# Patient Record
Sex: FEMALE | URBAN - METROPOLITAN AREA
[De-identification: names, ages, dates, MRNs, and addresses within clinical notes are randomized per-mention and may not be internally consistent; named-entity substitution may affect disease eponyms.]

---

## 2023-12-05 PROBLEM — F31.64 BIPOLAR DISORDER, CURRENT EPISODE MIXED, SEVERE, WITH PSYCHOTIC FEATURES (HCC): Status: ACTIVE | Noted: 2023-12-05

## 2023-12-20 PROBLEM — F33.2 MAJOR DEPRESSIVE DISORDER, RECURRENT SEVERE WITHOUT PSYCHOTIC FEATURES (HCC): Status: ACTIVE | Noted: 2023-12-20

## 2023-12-20 PROBLEM — F41.1 GENERALIZED ANXIETY DISORDER: Status: ACTIVE | Noted: 2023-12-20

## 2024-01-09 PROBLEM — F32.9 MAJOR DEPRESSIVE DISORDER WITH CURRENT ACTIVE EPISODE: Status: ACTIVE | Noted: 2024-01-09

## 2024-04-02 ENCOUNTER — OFFICE VISIT (OUTPATIENT)
Dept: FAMILY MEDICINE CLINIC | Facility: CLINIC | Age: 39
End: 2024-04-02
Payer: COMMERCIAL

## 2024-04-02 VITALS
RESPIRATION RATE: 16 BRPM | DIASTOLIC BLOOD PRESSURE: 82 MMHG | TEMPERATURE: 98 F | WEIGHT: 188 LBS | HEART RATE: 80 BPM | OXYGEN SATURATION: 98 % | SYSTOLIC BLOOD PRESSURE: 124 MMHG | HEIGHT: 63 IN | BODY MASS INDEX: 33.31 KG/M2

## 2024-04-02 DIAGNOSIS — Z01.419 WELL WOMAN EXAM WITH ROUTINE GYNECOLOGICAL EXAM: ICD-10-CM

## 2024-04-02 DIAGNOSIS — F33.2 MAJOR DEPRESSIVE DISORDER, RECURRENT SEVERE WITHOUT PSYCHOTIC FEATURES (HCC): ICD-10-CM

## 2024-04-02 DIAGNOSIS — E03.8 OTHER SPECIFIED HYPOTHYROIDISM: Primary | ICD-10-CM

## 2024-04-02 DIAGNOSIS — F31.64 BIPOLAR DISORDER, CURRENT EPISODE MIXED, SEVERE, WITH PSYCHOTIC FEATURES (HCC): ICD-10-CM

## 2024-04-02 DIAGNOSIS — F41.1 GENERALIZED ANXIETY DISORDER: ICD-10-CM

## 2024-04-02 PROCEDURE — 3008F BODY MASS INDEX DOCD: CPT | Performed by: FAMILY MEDICINE

## 2024-04-02 PROCEDURE — 3079F DIAST BP 80-89 MM HG: CPT | Performed by: FAMILY MEDICINE

## 2024-04-02 PROCEDURE — 99203 OFFICE O/P NEW LOW 30 MIN: CPT | Performed by: FAMILY MEDICINE

## 2024-04-02 PROCEDURE — 3074F SYST BP LT 130 MM HG: CPT | Performed by: FAMILY MEDICINE

## 2024-04-02 NOTE — PROGRESS NOTES
Chief Complaint   Patient presents with    Thyroid Problem     Check thyroid levels     Patient states she had pap last year.. Patient doesn't remember Dr name and will get back to us. Patient will see gyn for pap      Note to patient: The 21st Century Cures Act makes medical notes like these available to patients in the interest of transparency. However, be advised this is a medical document. It is intended as peer to peer communication. It is written in medical language and may contain abbreviations or verbiage that are unfamiliar. It may appear blunt or direct. Medical documents are intended to carry relevant information, facts as evident, and the clinical opinion of the practitioner.     HPI:    Patient here for hypothyroid.   Diagnosed in December   Periods are better, mood better.   Insomnia- ambien prn   Last A1c value was 5.3% done 12/6/2023.       Reports recurrent loss in family that made her have acute breakdown in December. She was admitted at . She is under their care. Currently on Olanzapine.   She was having severe depressive episodes.      She is on 25mcg of levothyrixine since December.     Wt Readings from Last 6 Encounters:   04/02/24 188 lb (85.3 kg)         Review of Systems   Constitutional: Negative for fever, chills and fatigue. No distress.  Respiratory: Negative for cough, chest tightness, shortness of breath and wheezing.    Cardiovascular: Negative for chest pain, palpitations and leg swelling.   Gastrointestinal: Negative for nausea, vomiting     Patient Active Problem List   Diagnosis    Bipolar disorder, current episode mixed, severe, with psychotic features (HCC)    Major depressive disorder, recurrent severe without psychotic features (HCC)    Generalized anxiety disorder    Major depressive disorder with current active episode       Past Medical History:   Diagnosis Date    Dysmenorrhea     Hypothyroidism      History reviewed. No pertinent surgical history.  Family History    Problem Relation Age of Onset    Hypertension Father      Social History     Socioeconomic History    Marital status:    Tobacco Use    Smoking status: Never     Passive exposure: Never    Smokeless tobacco: Never   Vaping Use    Vaping Use: Never used   Substance and Sexual Activity    Alcohol use: Not Currently    Drug use: Never       Current Outpatient Medications   Medication Sig Dispense Refill    clonazePAM 1 MG Oral Tab Take 1 tablet (1 mg total) by mouth 3 (three) times daily as needed for Anxiety. 90 tablet 1    metFORMIN 500 MG Oral Tab Take 1 tablet (500 mg total) by mouth daily with breakfast. 30 tablet 1    OLANZapine 10 MG Oral Tab Take 1 tablet (10 mg total) by mouth at bedtime. 30 tablet 1    zolpidem 10 MG Oral Tab Take 1 tablet (10 mg total) by mouth nightly as needed for Sleep. 30 tablet 1    levothyroxine 25 MCG Oral Tab Take 1 tablet (25 mcg total) by mouth before breakfast. 30 tablet 0    Norethindrone Acet-Ethinyl Est (URI 1/20) 1-20 MG-MCG Oral Tab Take 1 tablet by mouth daily.         Allergies  Allergies   Allergen Reactions    Ceclor [Cefaclor] RASH       Health Maintenance  Immunizations:  Immunization History   Administered Date(s) Administered    Covid-19 Vaccine Pfizer 30 mcg/0.3 ml 06/15/2021, 07/06/2021         Physical Exam  /82   Pulse 80   Temp 98 °F (36.7 °C) (Temporal)   Resp 16   Ht 5' 3\" (1.6 m)   Wt 188 lb (85.3 kg)   LMP 11/28/2023 (Approximate)   SpO2 98%   BMI 33.30 kg/m²   Constitutional: Oriented to person, place, and time. No distress.   HEENT:  Normocephalic and atraumatic.  Neck:  No mass and no thyromegaly present.   Cardiovascular: Normal rate, regular rhythm   Skin: Skin is warm and dry.  Psychiatric: Normal mood and affect.     A/P:    Encounter Diagnoses   Name Primary?    Bipolar disorder, current episode mixed, severe, with psychotic features (HCC)     Major depressive disorder, recurrent severe without psychotic features (HCC)      Generalized anxiety disorder     Well woman exam with routine gynecological exam     Other specified hypothyroidism Yes       1. Bipolar disorder, current episode mixed, severe, with psychotic features (HCC)    2. Major depressive disorder, recurrent severe without psychotic features (HCC)   at goal, no suicidal or homicidal thoughts. Continue present management. Patient will call if any symptoms worsen. Patient understands risks that anti-depressants as well as anti-anxiety agents have been shown to paradoxically worsen anxiety and depression and trigger suicidal thoughts. If any of these thoughts are present patient will stop the medication(s) immediately and call the office. Patient understands and agrees to the above plan. Medication: Continue with the same medication(s)-- per psych.     3. Generalized anxiety disorder    4. Well woman exam with routine gynecological exam  Referred to GYN.   - CBC With Differential With Platelet; Future  - Comp Metabolic Panel (14); Future  - Lipid Panel; Future  - TSH W Reflex To Free T4; Future  - OBG Referral - In Network    5. Other specified hypothyroidism  Continue on levothyroxine 25mcg daily.   Will recheck labs.    Orders Placed This Encounter   Procedures    CBC With Differential With Platelet    Comp Metabolic Panel (14)    Lipid Panel    TSH W Reflex To Free T4       Meds & Refills for this Visit:  Requested Prescriptions      No prescriptions requested or ordered in this encounter       Imaging & Consults:  OBG - INTERNAL      No follow-ups on file.    There are no Patient Instructions on file for this visit.    All questions were answered and the patient understands the plan.     Ingris Farias,         This note was prepared using Dragon Medical voice recognition dictation software. As a result errors may occur. When identified these errors have been corrected. While every attempt is made to correct errors during dictation discrepancies may still  exist.      Note to patient: The 21st Century Cures Act makes medical notes like these available to patients in the interest of transparency. However, be advised this is a medical document. It is intended as peer to peer communication. It is written in medical language and may contain abbreviations or verbiage that are unfamiliar. It may appear blunt or direct. Medical documents are intended to carry relevant information, facts as evident, and the clinical opinion of the practitioner.

## 2024-04-23 DIAGNOSIS — F32.9 MAJOR DEPRESSIVE DISORDER WITH CURRENT ACTIVE EPISODE, UNSPECIFIED DEPRESSION EPISODE SEVERITY, UNSPECIFIED WHETHER RECURRENT: ICD-10-CM

## 2024-04-23 RX ORDER — LEVOTHYROXINE SODIUM 0.03 MG/1
25 TABLET ORAL
Qty: 30 TABLET | Refills: 0 | Status: SHIPPED | OUTPATIENT
Start: 2024-04-23 | End: 2024-05-23

## 2024-04-23 NOTE — TELEPHONE ENCOUNTER
Pt would like a new prescription for the Levothyroxine sent to local [facility] pharmacy. Pt is aware that she needs to have labs done.

## 2024-04-23 NOTE — TELEPHONE ENCOUNTER
Medication Quantity Refills Start End   levothyroxine 25 MCG Oral Tab () 30 tablet 0 3/5/2024 2024   Sig:   Take 1 tablet (25 mcg total) by mouth before breakfast.     Route:   Oral     Order #:   [id number]       Last OV 24  5. Other specified hypothyroidism  Continue on levothyroxine 25mcg daily.   Will recheck labs.      RX sent for 30 to [facility]

## 2024-05-06 ENCOUNTER — TELEPHONE (OUTPATIENT)
Dept: FAMILY MEDICINE CLINIC | Facility: CLINIC | Age: 39
End: 2024-05-06

## 2024-05-06 NOTE — TELEPHONE ENCOUNTER
Pts spouse calling in stating that he needs medical advise. Patient has stopped taking ZyPREXA and has questions.     Please call back and advise.   [phone]

## 2024-05-31 ENCOUNTER — LAB ENCOUNTER (OUTPATIENT)
Dept: LAB | Age: 39
End: 2024-05-31
Attending: FAMILY MEDICINE
Payer: COMMERCIAL

## 2024-05-31 DIAGNOSIS — Z01.419 WELL WOMAN EXAM WITH ROUTINE GYNECOLOGICAL EXAM: ICD-10-CM

## 2024-05-31 LAB
ALBUMIN SERPL-MCNC: 3.7 G/DL (ref 3.4–5)
ALBUMIN/GLOB SERPL: 1.1 {RATIO} (ref 1–2)
ALP LIVER SERPL-CCNC: 66 U/L
ALT SERPL-CCNC: 61 U/L
ANION GAP SERPL CALC-SCNC: 7 MMOL/L (ref 0–18)
AST SERPL-CCNC: 48 U/L (ref 15–37)
BASOPHILS # BLD AUTO: 0.04 X10(3) UL (ref 0–0.2)
BASOPHILS NFR BLD AUTO: 0.7 %
BILIRUB SERPL-MCNC: 0.6 MG/DL (ref 0.1–2)
BUN BLD-MCNC: 7 MG/DL (ref 9–23)
CALCIUM BLD-MCNC: 9.3 MG/DL (ref 8.5–10.1)
CHLORIDE SERPL-SCNC: 106 MMOL/L (ref 98–112)
CHOLEST SERPL-MCNC: 248 MG/DL (ref ?–200)
CO2 SERPL-SCNC: 23 MMOL/L (ref 21–32)
CREAT BLD-MCNC: 0.6 MG/DL
EGFRCR SERPLBLD CKD-EPI 2021: 117 ML/MIN/1.73M2 (ref 60–?)
EOSINOPHIL # BLD AUTO: 0.15 X10(3) UL (ref 0–0.7)
EOSINOPHIL NFR BLD AUTO: 2.5 %
ERYTHROCYTE [DISTWIDTH] IN BLOOD BY AUTOMATED COUNT: 12.9 %
FASTING PATIENT LIPID ANSWER: YES
FASTING STATUS PATIENT QL REPORTED: YES
GLOBULIN PLAS-MCNC: 3.3 G/DL (ref 2.8–4.4)
GLUCOSE BLD-MCNC: 101 MG/DL (ref 70–99)
HCT VFR BLD AUTO: 40.2 %
HDLC SERPL-MCNC: 55 MG/DL (ref 40–59)
HGB BLD-MCNC: 13.1 G/DL
IMM GRANULOCYTES # BLD AUTO: 0.02 X10(3) UL (ref 0–1)
IMM GRANULOCYTES NFR BLD: 0.3 %
LDLC SERPL CALC-MCNC: 157 MG/DL (ref ?–100)
LYMPHOCYTES # BLD AUTO: 1.92 X10(3) UL (ref 1–4)
LYMPHOCYTES NFR BLD AUTO: 31.9 %
MCH RBC QN AUTO: 30.2 PG (ref 26–34)
MCHC RBC AUTO-ENTMCNC: 32.6 G/DL (ref 31–37)
MCV RBC AUTO: 92.6 FL
MONOCYTES # BLD AUTO: 0.62 X10(3) UL (ref 0.1–1)
MONOCYTES NFR BLD AUTO: 10.3 %
NEUTROPHILS # BLD AUTO: 3.27 X10 (3) UL (ref 1.5–7.7)
NEUTROPHILS # BLD AUTO: 3.27 X10(3) UL (ref 1.5–7.7)
NEUTROPHILS NFR BLD AUTO: 54.3 %
NONHDLC SERPL-MCNC: 193 MG/DL (ref ?–130)
OSMOLALITY SERPL CALC.SUM OF ELEC: 280 MOSM/KG (ref 275–295)
PLATELET # BLD AUTO: 295 10(3)UL (ref 150–450)
POTASSIUM SERPL-SCNC: 4 MMOL/L (ref 3.5–5.1)
PROT SERPL-MCNC: 7 G/DL (ref 6.4–8.2)
RBC # BLD AUTO: 4.34 X10(6)UL
SODIUM SERPL-SCNC: 136 MMOL/L (ref 136–145)
TRIGL SERPL-MCNC: 199 MG/DL (ref 30–149)
TSI SER-ACNC: 1.61 MIU/ML (ref 0.36–3.74)
VLDLC SERPL CALC-MCNC: 39 MG/DL (ref 0–30)
WBC # BLD AUTO: 6 X10(3) UL (ref 4–11)

## 2024-05-31 PROCEDURE — 80061 LIPID PANEL: CPT | Performed by: FAMILY MEDICINE

## 2024-05-31 PROCEDURE — 80050 GENERAL HEALTH PANEL: CPT | Performed by: FAMILY MEDICINE

## 2024-07-12 ENCOUNTER — TELEPHONE (OUTPATIENT)
Dept: FAMILY MEDICINE CLINIC | Facility: CLINIC | Age: 39
End: 2024-07-12

## 2024-07-12 DIAGNOSIS — F32.9 MAJOR DEPRESSIVE DISORDER WITH CURRENT ACTIVE EPISODE, UNSPECIFIED DEPRESSION EPISODE SEVERITY, UNSPECIFIED WHETHER RECURRENT: ICD-10-CM

## 2024-07-12 RX ORDER — LEVOTHYROXINE SODIUM 0.03 MG/1
25 TABLET ORAL
Qty: 90 TABLET | Refills: 0 | Status: SHIPPED | OUTPATIENT
Start: 2024-07-12

## 2024-07-12 NOTE — TELEPHONE ENCOUNTER
Medication Quantity Refills Start End   levothyroxine 25 MCG Oral Tab () 30 tablet 0 2024   Sig:   Take 1 tablet (25 mcg total) by mouth before breakfast.     Route:   Oral     Order #:   [id number]       Last OV 24  5. Other specified hypothyroidism  Continue on levothyroxine 25mcg daily.   Will recheck labs.    Ingris Farias DO  2024  7:17 PM CDT       High TG, LFT's and cholesterol, f/u for annual, will review labs.     RX sent    Patient is due for physical. Please schedule

## 2024-07-12 NOTE — TELEPHONE ENCOUNTER
Patient's spouse calling for levo refill, says she sees a RN via telehealth monthly and was getting it from her and now wants to get it filled here, had labs in May.

## 2025-01-27 ENCOUNTER — APPOINTMENT (OUTPATIENT)
Dept: CT IMAGING | Age: 40
End: 2025-01-27
Attending: EMERGENCY MEDICINE
Payer: COMMERCIAL

## 2025-01-27 ENCOUNTER — TELEPHONE (OUTPATIENT)
Dept: FAMILY MEDICINE CLINIC | Facility: CLINIC | Age: 40
End: 2025-01-27

## 2025-01-27 ENCOUNTER — HOSPITAL ENCOUNTER (EMERGENCY)
Age: 40
Discharge: HOME | End: 2025-01-27
Attending: EMERGENCY MEDICINE
Payer: COMMERCIAL

## 2025-01-27 VITALS
TEMPERATURE: 98 F | BODY MASS INDEX: 23.92 KG/M2 | HEART RATE: 65 BPM | OXYGEN SATURATION: 100 % | HEIGHT: 63 IN | RESPIRATION RATE: 16 BRPM | SYSTOLIC BLOOD PRESSURE: 152 MMHG | DIASTOLIC BLOOD PRESSURE: 98 MMHG | WEIGHT: 135 LBS

## 2025-01-27 DIAGNOSIS — R11.2 REFRACTORY NAUSEA AND VOMITING: Primary | ICD-10-CM

## 2025-01-27 DIAGNOSIS — N94.6 MENSTRUAL CRAMPS: ICD-10-CM

## 2025-01-27 LAB
ALBUMIN SERPL-MCNC: 5.5 G/DL (ref 3.2–4.8)
ALBUMIN/GLOB SERPL: 1.7 {RATIO} (ref 1–2)
ALP LIVER SERPL-CCNC: 87 U/L
ALT SERPL-CCNC: 86 U/L
ANION GAP SERPL CALC-SCNC: 17 MMOL/L (ref 0–18)
AST SERPL-CCNC: 65 U/L (ref ?–34)
B-HCG UR QL: NEGATIVE
BASE EXCESS BLD CALC-SCNC: -7 MMOL/L
BASOPHILS # BLD AUTO: 0.03 X10(3) UL (ref 0–0.2)
BASOPHILS NFR BLD AUTO: 0.2 %
BILIRUB SERPL-MCNC: 1.2 MG/DL (ref 0.3–1.2)
BILIRUB UR QL STRIP.AUTO: NEGATIVE
BUN BLD-MCNC: <5 MG/DL (ref 9–23)
CALCIUM BLD-MCNC: 10 MG/DL (ref 8.7–10.6)
CHLORIDE SERPL-SCNC: 99 MMOL/L (ref 98–112)
CO2 BLD-SCNC: 16 MMOL/L (ref 22–32)
CO2 SERPL-SCNC: 17 MMOL/L (ref 21–32)
COLOR UR AUTO: YELLOW
CREAT BLD-MCNC: 0.77 MG/DL
EGFRCR SERPLBLD CKD-EPI 2021: 100 ML/MIN/1.73M2 (ref 60–?)
EOSINOPHIL # BLD AUTO: 0.07 X10(3) UL (ref 0–0.7)
EOSINOPHIL NFR BLD AUTO: 0.6 %
ERYTHROCYTE [DISTWIDTH] IN BLOOD BY AUTOMATED COUNT: 13.1 %
GLOBULIN PLAS-MCNC: 3.3 G/DL (ref 2–3.5)
GLUCOSE BLD-MCNC: 136 MG/DL (ref 70–99)
GLUCOSE UR STRIP.AUTO-MCNC: 100 MG/DL
HCO3 BLD-SCNC: 15.7 MEQ/L
HCT VFR BLD AUTO: 41.1 %
HGB BLD-MCNC: 14.9 G/DL
IMM GRANULOCYTES # BLD AUTO: 0.05 X10(3) UL (ref 0–1)
IMM GRANULOCYTES NFR BLD: 0.4 %
KETONES UR STRIP.AUTO-MCNC: >=160 MG/DL
LIPASE SERPL-CCNC: 33 U/L (ref 12–53)
LYMPHOCYTES # BLD AUTO: 0.92 X10(3) UL (ref 1–4)
LYMPHOCYTES NFR BLD AUTO: 7.3 %
MCH RBC QN AUTO: 31.2 PG (ref 26–34)
MCHC RBC AUTO-ENTMCNC: 36.3 G/DL (ref 31–37)
MCV RBC AUTO: 86 FL
MONOCYTES # BLD AUTO: 0.45 X10(3) UL (ref 0.1–1)
MONOCYTES NFR BLD AUTO: 3.6 %
NEUTROPHILS # BLD AUTO: 11 X10 (3) UL (ref 1.5–7.7)
NEUTROPHILS # BLD AUTO: 11 X10(3) UL (ref 1.5–7.7)
NEUTROPHILS NFR BLD AUTO: 87.9 %
NITRITE UR QL STRIP.AUTO: NEGATIVE
PCO2 BLD: 18 MMHG
PH BLD: 7.55 [PH]
PH UR STRIP.AUTO: 6 [PH] (ref 5–8)
PLATELET # BLD AUTO: 334 10(3)UL (ref 150–450)
PO2 BLD: 34 MMHG
POCT INFLUENZA A: NEGATIVE
POCT INFLUENZA B: NEGATIVE
POTASSIUM SERPL-SCNC: 4.5 MMOL/L (ref 3.5–5.1)
PROT SERPL-MCNC: 8.8 G/DL (ref 5.7–8.2)
RBC # BLD AUTO: 4.78 X10(6)UL
RBC #/AREA URNS AUTO: >10 /HPF
SAO2 % BLD: 76 %
SARS-COV-2 RNA RESP QL NAA+PROBE: NOT DETECTED
SODIUM SERPL-SCNC: 133 MMOL/L (ref 136–145)
SP GR UR STRIP.AUTO: 1.02 (ref 1–1.03)
UROBILINOGEN UR STRIP.AUTO-MCNC: 0.2 MG/DL
WBC # BLD AUTO: 12.5 X10(3) UL (ref 4–11)

## 2025-01-27 PROCEDURE — 96374 THER/PROPH/DIAG INJ IV PUSH: CPT

## 2025-01-27 PROCEDURE — 74177 CT ABD & PELVIS W/CONTRAST: CPT | Performed by: EMERGENCY MEDICINE

## 2025-01-27 PROCEDURE — 96375 TX/PRO/DX INJ NEW DRUG ADDON: CPT

## 2025-01-27 PROCEDURE — 85025 COMPLETE CBC W/AUTO DIFF WBC: CPT | Performed by: EMERGENCY MEDICINE

## 2025-01-27 PROCEDURE — 81001 URINALYSIS AUTO W/SCOPE: CPT | Performed by: EMERGENCY MEDICINE

## 2025-01-27 PROCEDURE — 87502 INFLUENZA DNA AMP PROBE: CPT | Performed by: EMERGENCY MEDICINE

## 2025-01-27 PROCEDURE — 81025 URINE PREGNANCY TEST: CPT

## 2025-01-27 PROCEDURE — 87088 URINE BACTERIA CULTURE: CPT | Performed by: EMERGENCY MEDICINE

## 2025-01-27 PROCEDURE — 87086 URINE CULTURE/COLONY COUNT: CPT | Performed by: EMERGENCY MEDICINE

## 2025-01-27 PROCEDURE — 99285 EMERGENCY DEPT VISIT HI MDM: CPT

## 2025-01-27 PROCEDURE — 87186 SC STD MICRODIL/AGAR DIL: CPT | Performed by: EMERGENCY MEDICINE

## 2025-01-27 PROCEDURE — 96376 TX/PRO/DX INJ SAME DRUG ADON: CPT

## 2025-01-27 PROCEDURE — 82803 BLOOD GASES ANY COMBINATION: CPT

## 2025-01-27 PROCEDURE — S0119 ONDANSETRON 4 MG: HCPCS

## 2025-01-27 PROCEDURE — 80053 COMPREHEN METABOLIC PANEL: CPT | Performed by: EMERGENCY MEDICINE

## 2025-01-27 PROCEDURE — 99284 EMERGENCY DEPT VISIT MOD MDM: CPT

## 2025-01-27 PROCEDURE — 96361 HYDRATE IV INFUSION ADD-ON: CPT

## 2025-01-27 PROCEDURE — 81015 MICROSCOPIC EXAM OF URINE: CPT | Performed by: EMERGENCY MEDICINE

## 2025-01-27 PROCEDURE — 83690 ASSAY OF LIPASE: CPT | Performed by: EMERGENCY MEDICINE

## 2025-01-27 RX ORDER — HALOPERIDOL 5 MG/ML
5 INJECTION INTRAMUSCULAR ONCE
Status: COMPLETED | OUTPATIENT
Start: 2025-01-27 | End: 2025-01-27

## 2025-01-27 RX ORDER — IOPAMIDOL 755 MG/ML
85 INJECTION, SOLUTION INTRAVASCULAR
Status: COMPLETED | OUTPATIENT
Start: 2025-01-27 | End: 2025-01-27

## 2025-01-27 RX ORDER — ONDANSETRON 4 MG/1
4 TABLET, ORALLY DISINTEGRATING ORAL EVERY 4 HOURS PRN
Qty: 10 TABLET | Refills: 0 | Status: SHIPPED | OUTPATIENT
Start: 2025-01-27 | End: 2025-02-03

## 2025-01-27 RX ORDER — ONDANSETRON 4 MG/1
4 TABLET, ORALLY DISINTEGRATING ORAL ONCE
Status: COMPLETED | OUTPATIENT
Start: 2025-01-27 | End: 2025-01-27

## 2025-01-27 RX ORDER — METOCLOPRAMIDE HYDROCHLORIDE 5 MG/ML
10 INJECTION INTRAMUSCULAR; INTRAVENOUS ONCE
Status: COMPLETED | OUTPATIENT
Start: 2025-01-27 | End: 2025-01-27

## 2025-01-27 RX ORDER — KETOROLAC TROMETHAMINE 15 MG/ML
15 INJECTION, SOLUTION INTRAMUSCULAR; INTRAVENOUS ONCE
Status: COMPLETED | OUTPATIENT
Start: 2025-01-27 | End: 2025-01-27

## 2025-01-27 RX ORDER — LORAZEPAM 2 MG/ML
1 INJECTION INTRAMUSCULAR ONCE
Status: COMPLETED | OUTPATIENT
Start: 2025-01-27 | End: 2025-01-27

## 2025-01-27 RX ORDER — DIPHENHYDRAMINE HYDROCHLORIDE 50 MG/ML
25 INJECTION INTRAMUSCULAR; INTRAVENOUS ONCE
Status: COMPLETED | OUTPATIENT
Start: 2025-01-27 | End: 2025-01-27

## 2025-01-27 RX ORDER — ONDANSETRON 2 MG/ML
4 INJECTION INTRAMUSCULAR; INTRAVENOUS ONCE
Status: COMPLETED | OUTPATIENT
Start: 2025-01-27 | End: 2025-01-27

## 2025-01-27 RX ORDER — DIPHENHYDRAMINE HYDROCHLORIDE 50 MG/ML
50 INJECTION INTRAMUSCULAR; INTRAVENOUS ONCE
Status: COMPLETED | OUTPATIENT
Start: 2025-01-27 | End: 2025-01-27

## 2025-01-27 NOTE — TELEPHONE ENCOUNTER
Patient has severe menstrual cramps.  Patient is vomitting. Patient has not slept. She can't keep water down. Please advise.

## 2025-01-28 NOTE — RESPIRATORY THERAPY NOTE
i-STAT Testing  Site vein  Time 2055  Jose M's test result N/A  Clinical data  pH 7.55  PCO2 18  PO2 34  BE -7  HCO3 16  sO2 76%    Called and Verified.  Results read back.

## 2025-01-28 NOTE — ED PROVIDER NOTES
Patient Seen in: [facility] [place]      History     Chief Complaint   Patient presents with    Abdomen/Flank Pain    Nausea/Vomiting/Diarrhea     Stated Complaint: vomiting since midnoc    Subjective:   HPI      Patient with a history of hypothyroidism dysmenorrhea presents to the emergency department with upper abdominal pain and vomiting since midnight.  Patient has used THC to treat her menstrual cramping and her menstrual cycle started yesterday and the vomiting began at 8 AM today.  Patient has been increasingly more nauseous lately and I discussed with her that it may be the use of the THC causing the inability to stop vomiting.  Patient states she has been under a lot of stress recently as well.  Objective:     Past Medical History:    Dysmenorrhea    Hypothyroidism              History reviewed. No pertinent surgical history.             Social History     Socioeconomic History    Marital status:    Tobacco Use    Smoking status: Never     Passive exposure: Never    Smokeless tobacco: Never   Vaping Use    Vaping status: Never Used   Substance and Sexual Activity    Alcohol use: Not Currently    Drug use: Never                  Physical Exam     ED Triage Vitals [01/27/25 1734]   /69   Pulse 84   Resp 18   Temp 96.5 °F (35.8 °C)   Temp src Temporal   SpO2 99 %   O2 Device None (Room air)       Current Vitals:   Vital Signs  BP: 139/69  Pulse: 84  Resp: 18  Temp: 96.5 °F (35.8 °C)  Temp src: Temporal    Oxygen Therapy  SpO2: 99 %  O2 Device: None (Room air)        Physical Exam  Vitals and nursing note reviewed. Exam conducted with a chaperone present.   Constitutional:       Appearance: She is obese.      Comments: Intermittently retching and crying 39-year-old woman sitting on emergency department bed with her  seated at bedside her HEENT exam reveals pale dry oral mucosa her neck is supple her lungs are clear to auscultation heart has regular rate and rhythm  without murmurs rubs or gallops patient with epigastric tenderness to palpation   HENT:      Mouth/Throat:      Comments: Dry oral mucosa      Abdominal:      Palpations: Abdomen is soft.      Tenderness: There is abdominal tenderness in the epigastric area.   Skin:     Coloration: Skin is pale.   Neurological:      Mental Status: She is alert.             ED Course     Labs Reviewed   CBC WITH DIFFERENTIAL WITH PLATELET   COMP METABOLIC PANEL (14)   LIPASE   POCT FLU TEST   RAPID SARS-COV-2 BY PCR       ED Course as of 01/27/25 2305  ------------------------------------------------------------  Time: 01/27 2033  Comment: Patient remains feeling unwell.  Adding Toradol and lorazepam to see if that would help states still incredibly nauseous.  States pain is still severe from her menstrual cramps.    Do suspect that there is a more than small amount of this this this hyperemesis cannabinoid syndrome did speak with the patient about taking Haldol and Benadryl and that it can be super helpful for this but patient does not want to have that she said she has had Haldol in the past and that it made her want to run out of the ER I told her that we get with Benadryl to decrease that she states that Benadryl was given to her as the antidote and it did work but she never wants that medication again.  ------------------------------------------------------------  Time: 01/27 2034  Value: Carbon Dioxide, Total(!): 17.0  Comment: (Reviewed)  ------------------------------------------------------------  Time: 01/27 2034  Value: ANION GAP: 17  Comment: (Reviewed)  ------------------------------------------------------------  Time: 01/27 2034  Comment: Patient CO2 is 17 and her anion gap is 17 indicating that she is somewhat acidotic.  Will get a VBG to assess that  ------------------------------------------------------------  Time: 01/27 2146  Value: Blood Urine(!): Large  Comment:  (Reviewed)  ------------------------------------------------------------  Time: 01/27 2146  Value: Ketones, UA(!): >=160  Comment: (Reviewed)  ------------------------------------------------------------  Time: 01/27 2147  Value: WBC(!): 12.5  Comment: (Reviewed)  ------------------------------------------------------------  Time: 01/27 2147  Value: ISTAT BLOOD GAS PH: 7.55  Comment: Patient is not acidotic  ------------------------------------------------------------  Time: 01/27 2147  Value: ISTAT BLOOD GAS TCO2(!): 16  Comment: CO2 remains significantly low  ------------------------------------------------------------  Time: 01/27 2147  Comment: Significant amount of ketones in the urine I do have concerns that this may represent starvation ketosis.  Patient states she has not been eating well for 2 months  ------------------------------------------------------------  Time: 01/27 2302  Comment: Patient discharged as continuousy refusing haldol for hyperemesis cannabinoid syndrome and no other meds are working    Fluids in, color improving then began vomiting again  Patient finally willing to try haldol and benadryl or the vomiting- anxious she will feel worse.  CT abdomen and pelvis is benign        ***       MDM      ***        Medical Decision Making      Disposition and Plan     Clinical Impression:  No diagnosis found.     Disposition:  There is no disposition on file for this visit.  There is no disposition time on file for this visit.    Follow-up:  No follow-up provider specified.        Medications Prescribed:  Current Discharge Medication List              Supplementary Documentation:                                                            (Reviewed)  ------------------------------------------------------------  Time: 01/27 2147  Value: ISTAT BLOOD GAS PH: 7.55  Comment: Patient is not acidotic  ------------------------------------------------------------  Time: 01/27 2147  Value: ISTAT BLOOD GAS TCO2(!): 16  Comment: CO2 remains significantly low  ------------------------------------------------------------  Time: 01/27 2147  Comment: Significant amount of ketones in the urine I do have concerns that this may represent starvation ketosis.  Patient states she has not been eating well for 2 months  ------------------------------------------------------------  Time: 01/27 2302  Comment: Patient discharged as continuousy refusing haldol for hyperemesis cannabinoid syndrome and no other meds are working    Fluids in, color improving then began vomiting again  Patient finally willing to try haldol and benadryl or the vomiting- anxious she will feel worse.  CT abdomen and pelvis is benign               MDM      39-year-old patient presents to the emergency department with chief complaint of abdominal pain upper abdominal cramping nausea and vomiting states unable to stop vomiting since midnight doing it every hour other people in the home are not sick.  No fevers or chills.  Is not aware of having been exposed to norovirus or eating anything abnormal.    Patient does use marijuana containing products when she is on her menstrual cycle to help with severe abdominal and menstrual cramping.    Differential diagnosis includes but is not limited to cyclic vomiting, norovirus, foodborne gastroenteritis, bowel obstruction    Patient has had similar presentations in the past.  Patient states that her pain is severe she was initially treated with    Aggressive fluids, Zofran and Ativan Toradol for the pain ongoing IV fluids when that was not helpful she was then given Reglan more lorazepam as she was hyperventilating more fluids continuously refusing Haldol but  stating that neither the Zofran nor the Reglan nor the lorazepam were helping her nausea and vomiting all of which are very effective generally for nausea and vomiting from foodborne gastroenteritis or viral gastroenteritis or even bowel obstructions actually.  CT of the abdomen pelvis was done to rule out severe intra-abdominal obstruction pancreatitis or other emergent concerns and it is really very benign.  Told the patient that she should likely try Haldol and Benadryl she told me that she is allergic to Haldol.  It is not listed as an allergy and I asked her what her allergy was in its that she told me that it \"made her feel crazy\" it can be given with Benadryl to help mitigate some of those dystonic symptoms.  Given that symptoms are not improving with any conservative measures I think we really have to try and patient has received 2 full liters of IV fluids, her labs are reassuring and without Haldol I may not have any ability to control the nausea and vomiting.            Medical Decision Making      Disposition and Plan     Clinical Impression:  1. Refractory nausea and vomiting    2. Menstrual cramps         Disposition:  Discharge  1/27/2025 10:41 pm    Follow-up:  No follow-up provider specified.        Medications Prescribed:  Discharge Medication List as of 1/27/2025 10:45 PM        START taking these medications    Details   ondansetron 4 MG Oral Tablet Dispersible Take 1 tablet (4 mg total) by mouth every 4 (four) hours as needed for Nausea., Normal, Disp-10 tablet, R-0                 Supplementary Documentation:

## 2025-01-30 ENCOUNTER — APPOINTMENT (OUTPATIENT)
Dept: GENERAL RADIOLOGY | Age: 40
End: 2025-01-30
Attending: EMERGENCY MEDICINE
Payer: COMMERCIAL

## 2025-01-30 ENCOUNTER — TELEMEDICINE (OUTPATIENT)
Dept: FAMILY MEDICINE CLINIC | Facility: CLINIC | Age: 40
End: 2025-01-30
Payer: COMMERCIAL

## 2025-01-30 ENCOUNTER — HOSPITAL ENCOUNTER (OUTPATIENT)
Facility: HOSPITAL | Age: 40
Setting detail: OBSERVATION
Discharge: HOME | End: 2025-01-31
Attending: EMERGENCY MEDICINE | Admitting: STUDENT IN AN ORGANIZED HEALTH CARE EDUCATION/TRAINING PROGRAM
Payer: COMMERCIAL

## 2025-01-30 DIAGNOSIS — N10 ACUTE PYELONEPHRITIS: ICD-10-CM

## 2025-01-30 DIAGNOSIS — R11.11 VOMITING WITHOUT NAUSEA, UNSPECIFIED VOMITING TYPE: Primary | ICD-10-CM

## 2025-01-30 DIAGNOSIS — R11.2 NAUSEA AND VOMITING, UNSPECIFIED VOMITING TYPE: Primary | ICD-10-CM

## 2025-01-30 DIAGNOSIS — E87.6 HYPOKALEMIA: ICD-10-CM

## 2025-01-30 PROBLEM — E87.1 HYPONATREMIA: Status: ACTIVE | Noted: 2025-01-30

## 2025-01-30 PROBLEM — D72.829 LEUKOCYTOSIS: Status: ACTIVE | Noted: 2025-01-30

## 2025-01-30 PROBLEM — R73.9 HYPERGLYCEMIA: Status: ACTIVE | Noted: 2025-01-30

## 2025-01-30 LAB
ALBUMIN SERPL-MCNC: 4.9 G/DL (ref 3.2–4.8)
ALBUMIN/GLOB SERPL: 1.8 {RATIO} (ref 1–2)
ALP LIVER SERPL-CCNC: 76 U/L
ALT SERPL-CCNC: 42 U/L
ANION GAP SERPL CALC-SCNC: 12 MMOL/L (ref 0–18)
APTT PPP: 25.1 SECONDS (ref 23–36)
AST SERPL-CCNC: 25 U/L (ref ?–34)
BASOPHILS # BLD AUTO: 0.04 X10(3) UL (ref 0–0.2)
BASOPHILS NFR BLD AUTO: 0.3 %
BILIRUB SERPL-MCNC: 0.8 MG/DL (ref 0.3–1.2)
BUN BLD-MCNC: 13 MG/DL (ref 9–23)
CALCIUM BLD-MCNC: 10.1 MG/DL (ref 8.7–10.6)
CHLORIDE SERPL-SCNC: 94 MMOL/L (ref 98–112)
CO2 SERPL-SCNC: 26 MMOL/L (ref 21–32)
CREAT BLD-MCNC: 1.01 MG/DL
EGFRCR SERPLBLD CKD-EPI 2021: 72 ML/MIN/1.73M2 (ref 60–?)
EOSINOPHIL # BLD AUTO: 0.01 X10(3) UL (ref 0–0.7)
EOSINOPHIL NFR BLD AUTO: 0.1 %
ERYTHROCYTE [DISTWIDTH] IN BLOOD BY AUTOMATED COUNT: 13 %
GLOBULIN PLAS-MCNC: 2.7 G/DL (ref 2–3.5)
GLUCOSE BLD-MCNC: 121 MG/DL (ref 70–99)
HCG SERPL QL: NEGATIVE
HCT VFR BLD AUTO: 37.4 %
HGB BLD-MCNC: 13.8 G/DL
IMM GRANULOCYTES # BLD AUTO: 0.06 X10(3) UL (ref 0–1)
IMM GRANULOCYTES NFR BLD: 0.4 %
INR BLD: 1.01 (ref 0.8–1.2)
LIPASE SERPL-CCNC: 43 U/L (ref 12–53)
LYMPHOCYTES # BLD AUTO: 2.75 X10(3) UL (ref 1–4)
LYMPHOCYTES NFR BLD AUTO: 18.6 %
MAGNESIUM SERPL-MCNC: 2 MG/DL (ref 1.6–2.6)
MCH RBC QN AUTO: 31.4 PG (ref 26–34)
MCHC RBC AUTO-ENTMCNC: 36.9 G/DL (ref 31–37)
MCV RBC AUTO: 85 FL
MONOCYTES # BLD AUTO: 1.1 X10(3) UL (ref 0.1–1)
MONOCYTES NFR BLD AUTO: 7.5 %
NEUTROPHILS # BLD AUTO: 10.8 X10 (3) UL (ref 1.5–7.7)
NEUTROPHILS # BLD AUTO: 10.8 X10(3) UL (ref 1.5–7.7)
NEUTROPHILS NFR BLD AUTO: 73.1 %
OSMOLALITY SERPL CALC.SUM OF ELEC: 275 MOSM/KG (ref 275–295)
PLATELET # BLD AUTO: 356 10(3)UL (ref 150–450)
POTASSIUM SERPL-SCNC: 2.5 MMOL/L (ref 3.5–5.1)
PROT SERPL-MCNC: 7.6 G/DL (ref 5.7–8.2)
PROTHROMBIN TIME: 13.1 SECONDS (ref 11.6–14.8)
RBC # BLD AUTO: 4.4 X10(6)UL
SODIUM SERPL-SCNC: 132 MMOL/L (ref 136–145)
TROPONIN I SERPL HS-MCNC: 8 NG/L
WBC # BLD AUTO: 14.8 X10(3) UL (ref 4–11)

## 2025-01-30 PROCEDURE — 98006 SYNCH AUDIO-VIDEO EST MOD 30: CPT | Performed by: STUDENT IN AN ORGANIZED HEALTH CARE EDUCATION/TRAINING PROGRAM

## 2025-01-30 PROCEDURE — 99223 1ST HOSP IP/OBS HIGH 75: CPT | Performed by: STUDENT IN AN ORGANIZED HEALTH CARE EDUCATION/TRAINING PROGRAM

## 2025-01-30 PROCEDURE — 71045 X-RAY EXAM CHEST 1 VIEW: CPT | Performed by: EMERGENCY MEDICINE

## 2025-01-30 RX ORDER — METOCLOPRAMIDE HYDROCHLORIDE 5 MG/ML
10 INJECTION INTRAMUSCULAR; INTRAVENOUS ONCE
Status: COMPLETED | OUTPATIENT
Start: 2025-01-30 | End: 2025-01-30

## 2025-01-30 RX ORDER — POTASSIUM CHLORIDE 14.9 MG/ML
20 INJECTION INTRAVENOUS ONCE
Status: COMPLETED | OUTPATIENT
Start: 2025-01-30 | End: 2025-01-31

## 2025-01-30 RX ORDER — METOCLOPRAMIDE 10 MG/1
10 TABLET ORAL 3 TIMES DAILY PRN
Qty: 30 TABLET | Refills: 0 | Status: SHIPPED | OUTPATIENT
Start: 2025-01-30

## 2025-01-30 RX ORDER — FAMOTIDINE 40 MG/1
40 TABLET, FILM COATED ORAL DAILY PRN
Qty: 30 TABLET | Refills: 0 | Status: SHIPPED | OUTPATIENT
Start: 2025-01-30 | End: 2025-02-01

## 2025-01-30 RX ORDER — SULFAMETHOXAZOLE AND TRIMETHOPRIM 800; 160 MG/1; MG/1
1 TABLET ORAL 2 TIMES DAILY
Qty: 10 TABLET | Refills: 0 | Status: SHIPPED | OUTPATIENT
Start: 2025-01-30 | End: 2025-02-01

## 2025-01-30 RX ORDER — DIPHENHYDRAMINE HYDROCHLORIDE 50 MG/ML
25 INJECTION INTRAMUSCULAR; INTRAVENOUS ONCE
Status: COMPLETED | OUTPATIENT
Start: 2025-01-30 | End: 2025-01-30

## 2025-01-30 RX ORDER — ONDANSETRON 2 MG/ML
4 INJECTION INTRAMUSCULAR; INTRAVENOUS ONCE
Status: DISCONTINUED | OUTPATIENT
Start: 2025-01-30 | End: 2025-01-30

## 2025-01-30 RX ORDER — POTASSIUM CHLORIDE 1500 MG/1
40 TABLET, EXTENDED RELEASE ORAL ONCE
Status: COMPLETED | OUTPATIENT
Start: 2025-01-30 | End: 2025-01-30

## 2025-01-30 RX ORDER — LORAZEPAM 2 MG/ML
0.5 INJECTION INTRAMUSCULAR ONCE
Status: COMPLETED | OUTPATIENT
Start: 2025-01-30 | End: 2025-01-30

## 2025-01-30 NOTE — PROGRESS NOTES
Subjective:      No chief complaint on file.    HISTORY OF PRESENT ILLNESS  HPI  HPI obtained per patient report.  Mattie Zheng is a pleasant 39 year old female presenting virtually for follow-up after a recent ER visit.   On 1/27/25, she states that she developed chills, body aches, headache, rhinorrhea, nausea, vomiting, sore throat, and intermittent throat spasms. She was seen at the ER on 1/27 and discharged home with zofran, benadryl, and Bactrim.   She reports no significant change in her symptoms since her ER visit. She is continuing to vomit multiple times daily. The zofran helps mildly for her nausea, but she is unable to keep fluids or food down. She reports a small red speck with 1 of her emesis episodes earlier today, but otherwise denies any other associated symptoms.     PAST PATIENT HISTORY  Past Medical History:    Dysmenorrhea    Hypothyroidism     No past surgical history on file.    CURRENT MEDICATIONS  Medications Taking[1]    HEALTH MAINTENANCE  Immunization History   Administered Date(s) Administered    Covid-19 Vaccine Pfizer 30 mcg/0.3 ml 06/15/2021, 07/06/2021       ALLERGIES AND DRUG REACTIONS  Allergies[2]    Family History   Problem Relation Age of Onset    Hypertension Father      Social History     Socioeconomic History    Marital status:    Tobacco Use    Smoking status: Never     Passive exposure: Never    Smokeless tobacco: Never   Vaping Use    Vaping status: Never Used   Substance and Sexual Activity    Alcohol use: Not Currently    Drug use: Never       Review of Systems   Constitutional:  Positive for chills.   HENT:  Positive for rhinorrhea and sore throat.    Gastrointestinal:  Positive for nausea and vomiting.   Musculoskeletal:  Positive for myalgias.   Neurological:  Positive for headaches.   All other systems reviewed and are negative.         Objective:      LMP 01/26/2025 (Approximate)   There is no height or weight on file to calculate BMI.    Physical  Exam  Constitutional:       General: She is not in acute distress.     Appearance: She is not ill-appearing or toxic-appearing.      Comments: Appears fatigued   Pulmonary:      Effort: Pulmonary effort is normal.   Musculoskeletal:      Cervical back: Neck supple.   Neurological:      Mental Status: She is alert and oriented to person, place, and time.            Assessment and Plan:      1. Nausea and vomiting, unspecified vomiting type (Primary)  -     Metoclopramide HCl; Take 1 tablet (10 mg total) by mouth 3 (three) times daily as needed.  Dispense: 30 tablet; Refill: 0  -     Famotidine; Take 1 tablet (40 mg total) by mouth daily as needed for Heartburn.  Dispense: 30 tablet; Refill: 0  2. Acute pyelonephritis    Return if symptoms worsen or fail to improve.    - ER records reviewed. Evaluation showed neutrophilia, mild transaminitis, and UA consistent for UTI. Influenza, Covid-19, and lipase tests were negative. CT abdomen/pelvis showed signs of gastroenteritis  - DD includes viral gastroenteritis and pyelonephritis  - recommended trying reglan instead of zofran as needed for nausea/vomiting  - continue benadryl as needed for nausea/vomiting  - finish course of Bactrim as prescribed  - if she is still unable to increase her oral fluid intake and keep fluids down despite the above therapies, or if her symptoms worsen, recommended seeking immediate further care in the nearest ER including IV fluids    Patient verbalized understanding of assessment and recommendations. All questions and concerns were addressed.  Telehealth appointment with video and audio was scheduled and completed with the patient's informed consent.  Telehealth appointment took place in context of CDC social distancing guidelines during the Covid-19 pandemic.    Electronically signed by Hiram Iniguez MD       [1]   Outpatient Medications Marked as Taking for the 1/30/25 encounter (Telemedicine) with Hiram Iniguez MD   Medication Sig Dispense  Refill    metoclopramide 10 MG Oral Tab Take 1 tablet (10 mg total) by mouth 3 (three) times daily as needed. 30 tablet 0    famotidine 40 MG Oral Tab Take 1 tablet (40 mg total) by mouth daily as needed for Heartburn. 30 tablet 0   [2]   Allergies  Allergen Reactions    Ceclor [Cefaclor] RASH

## 2025-01-31 ENCOUNTER — ANESTHESIA EVENT (OUTPATIENT)
Dept: ENDOSCOPY | Facility: HOSPITAL | Age: 40
End: 2025-01-31
Payer: COMMERCIAL

## 2025-01-31 ENCOUNTER — ANESTHESIA (OUTPATIENT)
Dept: ENDOSCOPY | Facility: HOSPITAL | Age: 40
End: 2025-01-31
Payer: COMMERCIAL

## 2025-01-31 VITALS
WEIGHT: 135 LBS | TEMPERATURE: 98 F | BODY MASS INDEX: 23.92 KG/M2 | DIASTOLIC BLOOD PRESSURE: 84 MMHG | RESPIRATION RATE: 14 BRPM | SYSTOLIC BLOOD PRESSURE: 145 MMHG | HEIGHT: 63 IN | HEART RATE: 95 BPM | OXYGEN SATURATION: 99 %

## 2025-01-31 PROBLEM — E03.9 HYPOTHYROIDISM: Status: ACTIVE | Noted: 2025-01-31

## 2025-01-31 LAB
ADENOVIRUS PCR:: NOT DETECTED
ATRIAL RATE: 107 BPM
B PARAPERT DNA SPEC QL NAA+PROBE: NOT DETECTED
B PERT DNA SPEC QL NAA+PROBE: NOT DETECTED
C PNEUM DNA SPEC QL NAA+PROBE: NOT DETECTED
CORONAVIRUS 229E PCR:: NOT DETECTED
CORONAVIRUS HKU1 PCR:: NOT DETECTED
CORONAVIRUS NL63 PCR:: NOT DETECTED
CORONAVIRUS OC43 PCR:: NOT DETECTED
FLUAV RNA SPEC QL NAA+PROBE: NOT DETECTED
FLUBV RNA SPEC QL NAA+PROBE: NOT DETECTED
METAPNEUMOVIRUS PCR:: NOT DETECTED
MYCOPLASMA PNEUMONIA PCR:: NOT DETECTED
P AXIS: 4 DEGREES
P-R INTERVAL: 134 MS
PARAINFLUENZA 1 PCR:: NOT DETECTED
PARAINFLUENZA 2 PCR:: NOT DETECTED
PARAINFLUENZA 3 PCR:: NOT DETECTED
PARAINFLUENZA 4 PCR:: NOT DETECTED
Q-T INTERVAL: 368 MS
QRS DURATION: 78 MS
QTC CALCULATION (BEZET): 491 MS
R AXIS: -1 DEGREES
RHINOVIRUS/ENTERO PCR:: NOT DETECTED
RSV RNA SPEC QL NAA+PROBE: NOT DETECTED
SARS-COV-2 RNA NPH QL NAA+NON-PROBE: NOT DETECTED
T AXIS: -11 DEGREES
VENTRICULAR RATE: 107 BPM

## 2025-01-31 PROCEDURE — 99239 HOSP IP/OBS DSCHRG MGMT >30: CPT | Performed by: INTERNAL MEDICINE

## 2025-01-31 PROCEDURE — 0DJ08ZZ INSPECTION OF UPPER INTESTINAL TRACT, VIA NATURAL OR ARTIFICIAL OPENING ENDOSCOPIC: ICD-10-PCS | Performed by: INTERNAL MEDICINE

## 2025-01-31 RX ORDER — PANTOPRAZOLE SODIUM 40 MG/1
40 TABLET, DELAYED RELEASE ORAL
Qty: 120 TABLET | Refills: 0 | Status: SHIPPED | OUTPATIENT
Start: 2025-01-31 | End: 2025-04-01

## 2025-01-31 RX ORDER — SENNOSIDES 8.6 MG
17.2 TABLET ORAL NIGHTLY PRN
Status: DISCONTINUED | OUTPATIENT
Start: 2025-01-31 | End: 2025-02-01

## 2025-01-31 RX ORDER — ZOLPIDEM TARTRATE 10 MG/1
10 TABLET ORAL NIGHTLY PRN
Status: DISCONTINUED | OUTPATIENT
Start: 2025-01-31 | End: 2025-02-01

## 2025-01-31 RX ORDER — CLONAZEPAM 0.5 MG/1
1 TABLET ORAL 3 TIMES DAILY PRN
Status: DISCONTINUED | OUTPATIENT
Start: 2025-01-31 | End: 2025-02-01

## 2025-01-31 RX ORDER — BISACODYL 10 MG
10 SUPPOSITORY, RECTAL RECTAL
Status: DISCONTINUED | OUTPATIENT
Start: 2025-01-31 | End: 2025-02-01

## 2025-01-31 RX ORDER — LEVOTHYROXINE SODIUM 25 UG/1
25 TABLET ORAL
Status: DISCONTINUED | OUTPATIENT
Start: 2025-01-31 | End: 2025-02-01

## 2025-01-31 RX ORDER — SODIUM CHLORIDE, SODIUM LACTATE, POTASSIUM CHLORIDE, CALCIUM CHLORIDE 600; 310; 30; 20 MG/100ML; MG/100ML; MG/100ML; MG/100ML
INJECTION, SOLUTION INTRAVENOUS CONTINUOUS
Status: DISCONTINUED | OUTPATIENT
Start: 2025-01-31 | End: 2025-01-31

## 2025-01-31 RX ORDER — FAMOTIDINE 20 MG/1
20 TABLET, FILM COATED ORAL 2 TIMES DAILY
Status: DISCONTINUED | OUTPATIENT
Start: 2025-01-31 | End: 2025-01-31

## 2025-01-31 RX ORDER — BENZONATATE 100 MG/1
200 CAPSULE ORAL 3 TIMES DAILY PRN
Status: DISCONTINUED | OUTPATIENT
Start: 2025-01-31 | End: 2025-02-01

## 2025-01-31 RX ORDER — ONDANSETRON 2 MG/ML
4 INJECTION INTRAMUSCULAR; INTRAVENOUS EVERY 6 HOURS PRN
Status: DISCONTINUED | OUTPATIENT
Start: 2025-01-31 | End: 2025-02-01

## 2025-01-31 RX ORDER — LIDOCAINE HYDROCHLORIDE 10 MG/ML
INJECTION, SOLUTION EPIDURAL; INFILTRATION; INTRACAUDAL; PERINEURAL AS NEEDED
Status: DISCONTINUED | OUTPATIENT
Start: 2025-01-31 | End: 2025-01-31 | Stop reason: SURG

## 2025-01-31 RX ORDER — ACETAMINOPHEN 500 MG
1000 TABLET ORAL EVERY 8 HOURS PRN
Status: DISCONTINUED | OUTPATIENT
Start: 2025-01-31 | End: 2025-02-01

## 2025-01-31 RX ORDER — NALOXONE HYDROCHLORIDE 0.4 MG/ML
0.08 INJECTION, SOLUTION INTRAMUSCULAR; INTRAVENOUS; SUBCUTANEOUS ONCE AS NEEDED
Status: DISCONTINUED | OUTPATIENT
Start: 2025-01-31 | End: 2025-01-31

## 2025-01-31 RX ORDER — PROCHLORPERAZINE EDISYLATE 5 MG/ML
5 INJECTION INTRAMUSCULAR; INTRAVENOUS EVERY 8 HOURS PRN
Status: DISCONTINUED | OUTPATIENT
Start: 2025-01-31 | End: 2025-02-01

## 2025-01-31 RX ORDER — POLYETHYLENE GLYCOL 3350 17 G/17G
17 POWDER, FOR SOLUTION ORAL DAILY PRN
Status: DISCONTINUED | OUTPATIENT
Start: 2025-01-31 | End: 2025-02-01

## 2025-01-31 RX ORDER — ONDANSETRON 2 MG/ML
4 INJECTION INTRAMUSCULAR; INTRAVENOUS ONCE AS NEEDED
Status: DISCONTINUED | OUTPATIENT
Start: 2025-01-31 | End: 2025-01-31

## 2025-01-31 RX ORDER — ECHINACEA PURPUREA EXTRACT 125 MG
1 TABLET ORAL
Status: DISCONTINUED | OUTPATIENT
Start: 2025-01-31 | End: 2025-02-01

## 2025-01-31 RX ADMIN — LIDOCAINE HYDROCHLORIDE 50 MG: 10 INJECTION, SOLUTION EPIDURAL; INFILTRATION; INTRACAUDAL; PERINEURAL at 12:05:00

## 2025-01-31 RX ADMIN — SODIUM CHLORIDE, SODIUM LACTATE, POTASSIUM CHLORIDE, CALCIUM CHLORIDE: 600; 310; 30; 20 INJECTION, SOLUTION INTRAVENOUS at 12:18:00

## 2025-01-31 NOTE — ANESTHESIA PREPROCEDURE EVALUATION
PRE-OP EVALUATION    Patient Name: Mattie Zheng    Admit Diagnosis: Hypokalemia [E87.6]  Vomiting without nausea, unspecified vomiting type [R11.11]    Pre-op Diagnosis: Hematemesis    ESOPHAGOGASTRODUODENOSCOPY (EGD)    Anesthesia Procedure: ESOPHAGOGASTRODUODENOSCOPY (EGD)    Surgeons and Role:     * Matthew Taveras MD - Primary    Pre-op vitals reviewed.  Temp: 98.1 °F (36.7 °C)  Pulse: 112  Resp: 18  BP: 123/66  SpO2: 98 %  Body mass index is 23.91 kg/m².    Current medications reviewed.  Hospital Medications:   clonazePAM (KlonoPIN) tab 1 mg  1 mg Oral TID PRN    pantoprazole (Protonix) 40 mg in sodium chloride 0.9% PF 10 mL IV push  40 mg Intravenous Q12H    levothyroxine (Synthroid) tab 25 mcg  25 mcg Oral Before breakfast    QUEtiapine (SEROquel) tab 150 mg  150 mg Oral Nightly    zolpidem (Ambien) tab 10 mg  10 mg Oral Nightly PRN    lactated ringers infusion   Intravenous Continuous    acetaminophen (Tylenol Extra Strength) tab 1,000 mg  1,000 mg Oral Q8H PRN    melatonin tab 3 mg  3 mg Oral Nightly PRN    polyethylene glycol (PEG 3350) (Miralax) 17 g oral packet 17 g  17 g Oral Daily PRN    sennosides (Senokot) tab 17.2 mg  17.2 mg Oral Nightly PRN    bisacodyl (Dulcolax) 10 MG rectal suppository 10 mg  10 mg Rectal Daily PRN    ondansetron (Zofran) 4 MG/2ML injection 4 mg  4 mg Intravenous Q6H PRN    prochlorperazine (Compazine) 10 MG/2ML injection 5 mg  5 mg Intravenous Q8H PRN    benzonatate (Tessalon) cap 200 mg  200 mg Oral TID PRN    guaiFENesin (Robitussin) 100 MG/5 ML oral liquid 200 mg  200 mg Oral Q4H PRN    glycerin-hypromellose- (Artificial Tears) 0.2-0.2-1 % ophthalmic solution 1 drop  1 drop Both Eyes QID PRN    sodium chloride (Saline Mist) 0.65 % nasal solution 1 spray  1 spray Each Nare Q3H PRN    [COMPLETED] sodium chloride 0.9 % IV bolus 1,000 mL  1,000 mL Intravenous Once    [COMPLETED] metoclopramide (Reglan) 5 mg/mL injection 10 mg  10 mg Intravenous Once    [COMPLETED]  diphenhydrAMINE (Benadryl) 50 mg/mL  injection 25 mg  25 mg Intravenous Once    [COMPLETED] LORazepam (Ativan) 2 mg/mL injection 0.5 mg  0.5 mg Intravenous Once    [COMPLETED] potassium chloride (Klor-Con M20) tab 40 mEq  40 mEq Oral Once    [COMPLETED] potassium chloride 20 mEq/100mL IVPB premix 20 mEq  20 mEq Intravenous Once    [COMPLETED] pantoprazole (Protonix) 40 mg in sodium chloride 0.9% PF 10 mL IV push  40 mg Intravenous Once       Outpatient Medications:   Prescriptions Prior to Admission[1]    Allergies: Ceclor [cefaclor]      Anesthesia Evaluation    Patient summary reviewed.    Anesthetic Complications  (-) history of anesthetic complications         GI/Hepatic/Renal  Comment: N/V/hematemesis                               Cardiovascular    Negative cardiovascular ROS.        MET: >4                                           Endo/Other           (+) hypothyroidism                       Pulmonary    Negative pulmonary ROS.                       Neuro/Psych    Negative neuro/psych ROS.                                  History reviewed. No pertinent surgical history.  Social History     Socioeconomic History    Marital status:    Tobacco Use    Smoking status: Never     Passive exposure: Never    Smokeless tobacco: Never   Vaping Use    Vaping status: Never Used   Substance and Sexual Activity    Alcohol use: Not Currently    Drug use: Never     History   Drug Use Unknown     Available pre-op labs reviewed.  Lab Results   Component Value Date    WBC 14.8 (H) 01/30/2025    RBC 4.40 01/30/2025    HGB 13.8 01/30/2025    HCT 37.4 01/30/2025    MCV 85.0 01/30/2025    MCH 31.4 01/30/2025    MCHC 36.9 01/30/2025    RDW 13.0 01/30/2025    .0 01/30/2025     Lab Results   Component Value Date     (L) 01/30/2025    K 2.5 (LL) 01/30/2025    CL 94 (L) 01/30/2025    CO2 26.0 01/30/2025    BUN 13 01/30/2025    CREATSERUM 1.01 01/30/2025     (H) 01/30/2025    CA 10.1 01/30/2025     Lab Results    Component Value Date    INR 1.01 01/30/2025         Airway      Mallampati: II  Mouth opening: >3 FB  TM distance: > 6 cm  Neck ROM: full Cardiovascular    Cardiovascular exam normal.  Rhythm: regular  Rate: normal     Dental  Comment: Denies chipped or loose teeth           Pulmonary    Pulmonary exam normal.  Breath sounds clear to auscultation bilaterally.               Other findings              ASA: 2   Plan: MAC  NPO status verified and patient meets guidelines.        Comment: Plan for MAC with GA back up. Risks include but limited to awareness, oral and dental injury, nausea/vomiting, anaphylaxis, prolonged ventilation and myocardial infarction. All questions were answered to the patient's satisfaction. Patient expressed understanding and wishes to proceed.   Plan/risks discussed with: patient                Present on Admission:   Hyponatremia   Hypokalemia   Hyperglycemia   Leukocytosis             [1]   Medications Prior to Admission   Medication Sig Dispense Refill Last Dose/Taking    metoclopramide 10 MG Oral Tab Take 1 tablet (10 mg total) by mouth 3 (three) times daily as needed. 30 tablet 0 1/30/2025    famotidine 40 MG Oral Tab Take 1 tablet (40 mg total) by mouth daily as needed for Heartburn. 30 tablet 0 1/30/2025    ondansetron 4 MG Oral Tablet Dispersible Take 1 tablet (4 mg total) by mouth every 4 (four) hours as needed for Nausea. 10 tablet 0 Past Week    zolpidem 10 MG Oral Tab Take 1 tablet (10 mg total) by mouth nightly as needed for Sleep. 30 tablet 1 1/30/2025    QUEtiapine (SEROQUEL) 100 MG Oral Tab Take 1.5 tablets (150 mg total) by mouth nightly. 135 tablet 0 Past Week    clonazePAM 1 MG Oral Tab Take 1 tablet (1 mg total) by mouth 3 (three) times daily as needed for Anxiety. 90 tablet 1 Past Week    sulfamethoxazole-trimethoprim -160 MG Oral Tab per tablet Take 1 tablet by mouth 2 (two) times daily for 5 days. 10 tablet 0     levothyroxine 25 MCG Oral Tab Take 1 tablet (25  mcg total) by mouth before breakfast. 90 tablet 0 More than a month    Norethindrone Acet-Ethinyl Est (URI 1/20) 1-20 MG-MCG Oral Tab Take 1 tablet by mouth daily.   Unknown

## 2025-01-31 NOTE — ED PROVIDER NOTES
Patient Seen in: [facility]      History     Chief Complaint   Patient presents with    GI Bleeding     Stated Complaint: vomiting blood    Subjective:   HPI      Patient is a 39-year-old female presents to ED for evaluation of 4 days of vomiting.  Patient states has been vomiting for 4 days.  No diarrhea.  She threw up about 5 times today.  She states sometimes she throws up a small amount of blood in the emesis.  She has no abdominal pain.  She complains of some burning in her chest.  Patient was seen by my partner 3 days ago.  Given Zofran but she is still vomiting despite.  She feels some intermittent spasm in her throat.    Objective:     Past Medical History:    Dysmenorrhea    Hypothyroidism              History reviewed. No pertinent surgical history.             Social History     Socioeconomic History    Marital status:    Tobacco Use    Smoking status: Never     Passive exposure: Never    Smokeless tobacco: Never   Vaping Use    Vaping status: Never Used   Substance and Sexual Activity    Alcohol use: Not Currently    Drug use: Never     Social Drivers of Health     Food Insecurity: No Food Insecurity (1/31/2025)    Food Insecurity     Food Insecurity: Never true   Transportation Needs: No Transportation Needs (1/31/2025)    Transportation Needs     Lack of Transportation: No   Housing Stability: Low Risk  (1/31/2025)    Housing Stability     Housing Instability: No                  Physical Exam     ED Triage Vitals   BP 01/30/25 2221 (!) 137/98   Pulse 01/30/25 2036 (!) 145   Resp 01/30/25 2036 20   Temp 01/30/25 2036 99.3 °F (37.4 °C)   Temp src 01/30/25 2036 Oral   SpO2 01/30/25 2036 100 %   O2 Device 01/30/25 2036 None (Room air)       Current Vitals:   Vital Signs  BP: (!) 136/98  Pulse: 119  Resp: 20  Temp: 98.7 °F (37.1 °C)  Temp src: Oral  MAP (mmHg): (!) 110    Oxygen Therapy  SpO2: 98 %  O2 Device: None (Room air)  Pulse Oximetry Type: Continuous  Oximetry Probe Site  Changed: Yes  Pulse Ox Probe Location: Left hand        Physical Exam  GENERAL: No acute distress, well appearing and non-toxic, Alert and oriented X 3   HEENT: Normocephalic, atraumatic.  Moist mucous membranes.  Pupils equal round reactive to light and accommodation, extraocular motion is intact, sclerae white, conjunctiva is pink.  Oropharynx is unremarkable, no exudate.  NECK: Supple, trachea midline, no lymphadenopathy.   LUNG: Lungs clear to auscultation bilaterally, no wheezing, no rales, no rhonchi.  CARDIOVASCULAR: Regular rate and rhythm.  Normal S1S2.  No S3S4 or murmur.  ABDOMEN: Bowel sounds are present. Soft. nondistended, no pulsatile masses. nontender  MUSCULOSKELETAL: No calf tenderness.  Dorsalis and Posterior Tibial pulses present. No clubbing. No cyanosis.  No edema.   SKIN EXAMINATIoN: Warm and dry with normal appearance.  No rashes or lesions.  NEUROLOGICAL:  Motor strength intact all groups.  normal sensation, speech intact    ED Course     Labs Reviewed   CBC WITH DIFFERENTIAL WITH PLATELET - Abnormal; Notable for the following components:       Result Value    WBC 14.8 (*)     Neutrophil Absolute Prelim 10.80 (*)     Neutrophil Absolute 10.80 (*)     Monocyte Absolute 1.10 (*)     All other components within normal limits   COMP METABOLIC PANEL (14) - Abnormal; Notable for the following components:    Glucose 121 (*)     Sodium 132 (*)     Potassium 2.5 (*)     Chloride 94 (*)     Albumin 4.9 (*)     All other components within normal limits   LIPASE - Normal   HCG, BETA SUBUNIT, QUAL - Normal   PROTHROMBIN TIME (PT) - Normal   PTT, ACTIVATED - Normal   TROPONIN I HIGH SENSITIVITY - Normal   MAGNESIUM - Normal   RAINBOW DRAW BLUE   RESPIRATORY FLU EXPAND PANEL + COVID-19   GI STOOL PANEL BY PCR   C. DIFFICILE(TOXIGENIC)PCR   White blood cell count 14.8.  Sodium 132.  Potassium 2.5  EKG    Rate, intervals and axes as noted on EKG Report.  Rate: 107  Rhythm: Sinus Rhythm  Reading: Sinus  tachycardia.  Patient has minimal voltage criteria for LVH.  Some nonspecific changes.                Medications   clonazePAM (KlonoPIN) tab 1 mg (has no administration in time range)   famotidine (Pepcid) tab 20 mg (has no administration in time range)   pantoprazole (Protonix) 40 mg in sodium chloride 0.9% PF 10 mL IV push (has no administration in time range)   levothyroxine (Synthroid) tab 25 mcg (has no administration in time range)   QUEtiapine (SEROquel) tab 150 mg (150 mg Oral Not Given 1/31/25 0130)   zolpidem (Ambien) tab 10 mg (10 mg Oral Given 1/31/25 0147)   lactated ringers infusion ( Intravenous New Bag 1/31/25 0130)   acetaminophen (Tylenol Extra Strength) tab 1,000 mg (has no administration in time range)   melatonin tab 3 mg (has no administration in time range)   polyethylene glycol (PEG 3350) (Miralax) 17 g oral packet 17 g (has no administration in time range)   sennosides (Senokot) tab 17.2 mg (has no administration in time range)   bisacodyl (Dulcolax) 10 MG rectal suppository 10 mg (has no administration in time range)   ondansetron (Zofran) 4 MG/2ML injection 4 mg (has no administration in time range)   prochlorperazine (Compazine) 10 MG/2ML injection 5 mg (has no administration in time range)   benzonatate (Tessalon) cap 200 mg (has no administration in time range)   guaiFENesin (Robitussin) 100 MG/5 ML oral liquid 200 mg (has no administration in time range)   glycerin-hypromellose- (Artificial Tears) 0.2-0.2-1 % ophthalmic solution 1 drop (has no administration in time range)   sodium chloride (Saline Mist) 0.65 % nasal solution 1 spray (has no administration in time range)   sodium chloride 0.9 % IV bolus 1,000 mL (1,000 mL Intravenous New Bag 1/30/25 2247)   metoclopramide (Reglan) 5 mg/mL injection 10 mg (10 mg Intravenous Given 1/30/25 2247)   diphenhydrAMINE (Benadryl) 50 mg/mL  injection 25 mg (25 mg Intravenous Given 1/30/25 6111)   LORazepam (Ativan) 2 mg/mL injection 0.5  mg (0.5 mg Intravenous Given 1/30/25 2247)   potassium chloride (Klor-Con M20) tab 40 mEq (40 mEq Oral Given 1/30/25 2328)   potassium chloride 20 mEq/100mL IVPB premix 20 mEq (20 mEq Intravenous New Bag 1/30/25 2328)   pantoprazole (Protonix) 40 mg in sodium chloride 0.9% PF 10 mL IV push (40 mg Intravenous Given 1/30/25 2328)            [facility]      Patient is a 39-year-old female presents to ED for evaluation of vomiting.  Differential gastroenteritis, electrolyte abnormality.  Patient has a soft and nontender abdominal exam.  No abdominal pain.  Abdominal imaging not indicated.  Laboratory test obtained showing sodium of 132.  Potassium of 2.5.  White blood cell count 14.8.  EKG showing sinus tachycardia with nonspecific changes.  Given potassium of 2.5 recommend admission to hospital for potassium replacement.  Patient was given oral and IV potassium.  Case discussed with hospitalist.  Workup and results were discussed with patient. Patient has no other questions, complaints or concerns. Patient will be admitted to the hospital for further workup.  Critical care time was 35 minutes. This critical care time is exclusive of procedures critical care time includes monitoring of patient's cardiopulmonary and hemodynamic status, interpretation of laboratory values, and discussion of case with physician and consultants.    Admission disposition: 1/30/2025 11:23 PM           [facility]    Disposition and Plan     Clinical Impression:  1. Vomiting without nausea, unspecified vomiting type    2. Hypokalemia         Disposition:  Admit  1/30/2025 11:23 pm    Follow-up:  No follow-up provider specified.        Medications Prescribed:  Current Discharge Medication List              Supplementary Documentation:         Hospital Problems       Present on Admission  Date Reviewed: 1/30/2025            ICD-10-CM Noted POA    * (Principal) Vomiting without nausea, unspecified vomiting type R11.11 1/30/2025 Unknown    Hyperglycemia R73.9  1/30/2025 Yes    Hypokalemia E87.6 1/30/2025 Yes    Hyponatremia E87.1 1/30/2025 Yes    Hypothyroidism E03.9 1/31/2025 Unknown    Leukocytosis D72.829 1/30/2025 Yes                                                         Statement Selected

## 2025-01-31 NOTE — PLAN OF CARE
Assumed patient care at 0730. A/Ox4. Room air. EGD today. NPO for procedure. Clear liquid, advance as tolerated when back. Plan for discharge if tolerating diet. All safety precautions are in place and will continue with plan of care.    Problem: GASTROINTESTINAL - ADULT  Goal: Minimal or absence of nausea and vomiting  Description: INTERVENTIONS:  - Maintain adequate hydration with IV or PO as ordered and tolerated  - Nasogastric tube to low intermittent suction as ordered  - Evaluate effectiveness of ordered antiemetic medications  - Provide nonpharmacologic comfort measures as appropriate  - Advance diet as tolerated, if ordered  - Obtain nutritional consult as needed  - Evaluate fluid balance  Outcome: Progressing     Problem: METABOLIC/FLUID AND ELECTROLYTES - ADULT  Goal: Electrolytes maintained within normal limits  Description: INTERVENTIONS:  - Monitor labs and rhythm and assess patient for signs and symptoms of electrolyte imbalances  - Administer electrolyte replacement as ordered  - Monitor response to electrolyte replacements, including rhythm and repeat lab results as appropriate  - Fluid restriction as ordered  - Instruct patient on fluid and nutrition restrictions as appropriate  Outcome: Progressing

## 2025-01-31 NOTE — DISCHARGE SUMMARY
[facility]     Mattie Zheng Patient Status:  Observation    1985 MRN [id number]   Location [facility] Attending Eben Gaona, *   Hosp Day # 0 PCP Ingris Farias DO     Date of Admission: 2025  Date of Discharge:   2025    Discharge Disposition: Home or Self Care    Discharge Diagnosis:  #Gastroenteritis  -supportive management  -GI PCR and Cdiff ordered but unable to provide sample       #Hematemesis  Status post upper endoscopy most notable for grade D esophagitis, biopsies taken and pending  -PPI BID indefinitely and repeat EGD in 8-12 weeks     #Leukocytosis  -likely stress/reactive, monitor for any infectious sx and trend      #Hypothyroidism - continue home levothyroxine   #Pulmonary nodule - noted on prior CT A/P, discussed finding with pt on admission she plans for f/u with PCP for monitoring.     History of Present Illness: Mattie Zheng is a 39 year old female with PMHx Hypothyroidism who presents for nausea/vomiting.      Patient notes that she had onset of violent nausea and vomiting starting 2025.  Patient was seen in the ER and given oral antiemetics however symptoms continue get progressively worse at home, having significant vomiting with new streaks of blood and occasional clots as well.  Patient could not keep any liquids down at home and so presented back to the ER for further evaluation.  She does note that initially did have associated diarrhea however now this is stopped.  Patient also notes sick contact with friends daughter who had similar symptoms approximately 1 week ago.  Patient also endorsing occasional cough, congestion as well.  Denies any associated fevers, chills, abdominal pain, chest pain.  No prior history of similar symptoms.    Brief Synopsis: Patient was admitted, had EGD which revealed grade D esophagitis, diet was advanced, patient tolerating, to continue with PPI twice daily indefinitely and have  repeat endoscopy in 8 to 12 weeks as outlined by GI    Lace+ Score: 22  59-90 High Risk  29-58 Medium Risk  0-28   Low Risk       TCM Follow-Up Recommendation:  LACE < 29: Low Risk of readmission after discharge from the hospital. No TCM follow-up needed.      Procedures during hospitalization:   EGD    Incidental or significant findings and recommendations (brief descriptions):  Repeat EGD in 8-12 weeks    Lab/Test results pending at Discharge:   EGD path    Consultants:  GI    Discharge Medication List:     Discharge Medications        START taking these medications        Instructions Prescription details   pantoprazole 40 MG Tbec  Commonly known as: Protonix      Take 1 tablet (40 mg total) by mouth 2 (two) times daily before meals.   Stop taking on: April 1, 2025  Quantity: 120 tablet  Refills: 0            CONTINUE taking these medications        Instructions Prescription details   clonazePAM 1 MG Tabs  Commonly known as: KlonoPIN      Take 1 tablet (1 mg total) by mouth 3 (three) times daily as needed for Anxiety.   Stop taking on: March 9, 2025  Quantity: 90 tablet  Refills: 1     Iliana 1/20 1-20 MG-MCG Tabs  Generic drug: Norethindrone Acet-Ethinyl Est      Take 1 tablet by mouth daily.   Refills: 0     levothyroxine 25 MCG Tabs  Commonly known as: Synthroid      Take 1 tablet (25 mcg total) by mouth before breakfast.   Quantity: 90 tablet  Refills: 0     metoclopramide 10 MG Tabs  Commonly known as: Reglan      Take 1 tablet (10 mg total) by mouth 3 (three) times daily as needed.   Quantity: 30 tablet  Refills: 0     ondansetron 4 MG Tbdp  Commonly known as: Zofran-ODT      Take 1 tablet (4 mg total) by mouth every 4 (four) hours as needed for Nausea.   Stop taking on: February 3, 2025  Quantity: 10 tablet  Refills: 0     QUEtiapine 100 MG Tabs  Commonly known as: SEROquel      Take 1.5 tablets (150 mg total) by mouth nightly.   Stop taking on: April 8, 2025  Quantity: 135 tablet  Refills: 0     zolpidem  10 MG Tabs  Commonly known as: Ambien      Take 1 tablet (10 mg total) by mouth nightly as needed for Sleep.   Quantity: 30 tablet  Refills: 1            STOP taking these medications      famotidine 40 MG Tabs  Commonly known as: Pepcid        sulfamethoxazole-trimethoprim -160 MG Tabs per tablet  Commonly known as: Bactrim DS                  Where to Get Your Medications        These medications were sent to [id number] - [place], [place] - [address] [place] [facility] [place] [address], [phone], [phone]  [address] [facility], [facility] [address]      Hours: 24-hours Phone: [phone]   [facility]    Follow-up appointment:   No follow-up provider specified.  Appointments for Next 30 Days 2025 - 3/2/2025      None            Vital signs:  Temp:  [98.1 °F (36.7 °C)-99.3 °F (37.4 °C)] 98.1 °F (36.7 °C)  Pulse:  [] 95  Resp:  [10-21] 14  BP: (123-153)/(66-98) 145/84  SpO2:  [92 %-100 %] 99 %    Physical Exam:    General: No acute distress   Lungs: clear to auscultation  Cardiovascular: S1, S2  Abdomen: Soft      -----------------------------------------------------------------------------------------------  PATIENT DISCHARGE INSTRUCTIONS: See electronic chart    Tracee Kong DO    Total time spent on discharge plannin minutes     The  [employer] Cures Act makes medical notes like these available to patients in the interest of transparency. Please be advised this is a medical document. Medical documents are intended to carry relevant information, facts as evident, and the clinical opinion of the practitioner. The medical note is intended as peer to peer communication and may appear blunt or direct. It is written in medical language and may contain abbreviations or verbiage that are unfamiliar.

## 2025-01-31 NOTE — ED INITIAL ASSESSMENT (HPI)
Patient complains of vomiting, body aches, and chills for the last week. Patient was seen here 4 days ago and diagnosed with cyclic vomiting from marijuana. Patient states she vomited \"blood\" several times in the last several days. Patient states vomit color changes from \"brown to red.\" Patient states she is having difficulty keeping fluids down. Patient appears very anxious in triage. No signs of hypoperfusion or SOB noted. Patient tested negative for flu and COVID-19 here earlier this week.

## 2025-01-31 NOTE — OPERATIVE REPORT
EGD Operative Report  Patient Name: Mattie Zheng  YOB: 1985  MRN: [id number]  Procedure: Esophagogastroduodenoscopy (EGD)    Pre-operative Diagnosis & Indication: hematemesis  Post-operative Diagnosis:   LA grade D esophagitis  3 cm hiatal hernia  Attending Endoscopist: Matthew Taveras MD  Informed Consent: The planned procedure(s), the explanation of the procedure, its expected benefits, the potential complications and risks and possible alternatives and their benefits and risks were discussed with the patient or the patient's surrogate. The discussion of risks, not limited to but including bleeding, infection, perforation, adverse effects from anesthesia, need for emergency surgery/prolonged hospitalization,  cardiac arrhythmias,  and aspiration were discussed with patient.  Pt and/or surrogate understood the proposed procedure(s), its risks, benefits and alternatives and wish to proceed with procedure(s). All questions answered in full.  After all questions were answered to their satisfaction, a signed, informed, and witnessed consent was obtained.  Physical Exam: Heart: regular rate and rhythm. No rubs, murmurs, or gallops. Lungs: Clear to auscultation bilaterally. Abdomen: Soft, non-tender, non distended. No rebound tenderness, no guarding.   A TIME OUT WAS COMPLETED prior to the procedure to confirm the patient, procedure(s) and complete endoscopy safety procedure.  Sedation: Monitored Anesthesia Care; ASA class per anesthesiology team   Monitoring: Pulsoximetry, pulse, respirations, and blood pressure , vitals were monitored throughout the entire procedure under monitored anesthesia care.   Procedure: The patient was then brought to the endoscopy suite where his/her pulse, pulse oximetry and blood pressure were monitored. The patient was placed in the left lateral decubitus position and deep sedation was administered. Once adequate sedation was achieved, a bite block was placed and a lubricated  tip of an Olympus video upper endoscope was inserted through the oropharynx and gently manipulated through the esophagus into the stomach and the second portion of the duodenum. Upon withdrawal of the endoscope, careful visualization of the mucosa was performed. The endoscope was then withdrawn into the gastric antrum and placed in a retroflexed position.  The endoscope was then righted, and air was suctioned from the stomach.  The endoscope was then withdrawn from the patient, with careful visual inspection of the mucosa. The patient left the procedure room in stable condition for recovery. Findings and endotherapy as listed below  Toleration: Patient tolerated procedure well   Complications: No immediate complications   Technical Difficulty:  The procedure was not technically difficult   Estimated Blood Loss: Minimal, less than 5mL of estimated blood loss.   Findings and Therapeutics:  Esophagus:   LA grade D esophagitis was present in the distal and mid esophagus. No active bleeding or old blood was noted.  There were no strictures or stenosis. GEJ junction traversed with endoscope without resistance.  The Z-line was irregular, appreciated at 29 cm from the incisors. The diaphragmatic impression was appreciated at 32 cm from the incisors. A 3 cm hiatal hernia was present.  Stomach:    The gastric body, antrum, fundus, cardia, and angularis were normal. No ulcers, erosions or masses visualized. Endoscope was placed in a retroflexion view in the stomach. There was evidence of a hiatal hernia, Hill grade 3.   Duodenum:   The entire examined duodenum was normal.    Impression: hematemesis secondary to LA grade D esophagitis due to repeated bouts of vomiting.  Recommendations:  Post EGD precautions, watch for bleeding, infection, perforation, adverse drug reactions   Clear liquid diet, advance as tolerated  Pantoprazole 40mg IV BID while inpatient, at discharge will need PPI BID  Avoid non-aspirin NSAID  Repeat EGD in  8-12 weeks  Ok to DC from GI perspective when tolerating regular diet  GI will sign off now, we will help arrange outpatient GI f/u    Matthew Taveras MD  1/31/2025  12:12 PM

## 2025-01-31 NOTE — PROGRESS NOTES
NURSING ADMISSION NOTE      Patient admitted via Cart  Oriented to room.  Safety precautions initiated.  Bed in low position.  Call light in reach.    Assumed care at 0100. A&OX4. RA. Navigator completed at bedside. Pt denies any pain at this time. Safety precautions in place, call light within reach. Will continue with plan of care.

## 2025-01-31 NOTE — PROGRESS NOTES
[facility]   part of [facility]     Hospitalist Progress Note     Mattie Zheng Patient Status:  Observation    1985 MRN [id number]   Location [facility] 3NE-A Attending Jimenez, Eben Arteaga, *   Hosp Day # 0 PCP Ingris Farias DO     Chief Complaint: Emesis    Subjective:     Patient no emesis or diarrhea, no abdominal pain  Nervous about endoscopy     Objective:    Review of Systems:   A comprehensive review of systems was completed; pertinent positive and negatives stated in subjective.    Vital signs:  Temp:  [98.1 °F (36.7 °C)-99.3 °F (37.4 °C)] 98.1 °F (36.7 °C)  Pulse:  [] 112  Resp:  [18-21] 18  BP: (123-141)/(66-98) 123/66  SpO2:  [92 %-100 %] 98 %    Physical Exam:    General: No acute distress  Respiratory: No wheezes, no rhonchi  Cardiovascular: S1, S2, regular rate and rhythm  Abdomen: Soft, Non-tender, non-distended, positive bowel sounds  Neuro: No new focal deficits.   Extremities: No edema      Diagnostic Data:    Labs:  Recent Labs   Lab 25  2229   WBC 12.5* 14.8*   HGB 14.9 13.8   MCV 86.0 85.0   .0 356.0   INR  --  1.01       Recent Labs   Lab 25  2234   * 121*   BUN <5* 13   CREATSERUM 0.77 1.01   CA 10.0 10.1   ALB 5.5* 4.9*   * 132*   K 4.5 2.5*   CL 99 94*   CO2 17.0* 26.0   ALKPHO 87 76   AST 65* 25   ALT 86* 42   BILT 1.2 0.8   TP 8.8* 7.6       Estimated Glomerular Filtration Rate: 72.3 mL/min/1.73m2 (by CKD-EPI based on SCr of 1.01 mg/dL).    Recent Labs   Lab 25  2234   TROPHS 8       Recent Labs   Lab 25  2229   PTP 13.1   INR 1.01                  Microbiology    No results found for this visit on 25.      Imaging: Reviewed in Epic.    Medications:    famotidine  20 mg Oral BID    pantoprazole  40 mg Intravenous Q12H    levothyroxine  25 mcg Oral Before breakfast    QUEtiapine  150 mg Oral Nightly       Assessment & Plan:      #Gastroenteritis  -supportive management  -diet  as tolerated pending EGD  -GI PCR and Cdiff pending     #BRBPR, Hematemesis  -PPI BID  -GI on consult - EGD today   -Monitor Hb    #Leukocytosis  -likely stress/reactive, monitor for any infectious sx and trend      #Hypothyroidism - continue home levothyroxine   #Pulmonary nodule - noted on prior CT A/P, discussed finding with pt on admission she plans for f/u with PCP for monitoring.       Tracee Kong, DO    Supplementary Documentation:     Quality:  DVT Mechanical Prophylaxis:   SCDs, Early ambuation  DVT Pharmacologic Prophylaxis   Medication   None                Code Status: Not on file  Humphreys: No urinary catheter in place      The 21st Century Cures Act makes medical notes like these available to patients in the interest of transparency. Please be advised this is a medical document. Medical documents are intended to carry relevant information, facts as evident, and the clinical opinion of the practitioner. The medical note is intended as peer to peer communication and may appear blunt or direct. It is written in medical language and may contain abbreviations or verbiage that are unfamiliar.

## 2025-01-31 NOTE — H&P
[facility]  History and Physical     Mattie Zheng Patient Status:  Emergency    1985 MRN [id number]   Location [facility] IN [place] Attending Gab Law MD   Hosp Day # 0 PCP Ingris Farias DO     Chief Complaint: Intractable nausea/vomiting    History of Present Illness: Mattie Zheng is a 39 year old female with PMHx Hypothyroidism who presents for nausea/vomiting.     Patient notes that she had onset of violent nausea and vomiting starting 2025.  Patient was seen in the ER and given oral antiemetics however symptoms continue get progressively worse at home, having significant vomiting with new streaks of blood and occasional clots as well.  Patient could not keep any liquids down at home and so presented back to the ER for further evaluation.  She does note that initially did have associated diarrhea however now this is stopped.  Patient also notes sick contact with friends daughter who had similar symptoms approximately 1 week ago.  Patient also endorsing occasional cough, congestion as well.  Denies any associated fevers, chills, abdominal pain, chest pain.  No prior history of similar symptoms.    Past Medical History:  Past Medical History:    Dysmenorrhea    Hypothyroidism        Past Surgical History: History reviewed. No pertinent surgical history.    Social History:  reports that she has never smoked. She has never been exposed to tobacco smoke. She has never used smokeless tobacco. She reports that she does not currently use alcohol. She reports that she does not use drugs.    Family History:   Family History   Problem Relation Age of Onset    Hypertension Father        Allergies: Allergies[1]    Medications:  Medications Ordered Prior to Encounter[2]    Review of Systems:   A comprehensive 14 point review of systems was completed.    Pertinent positives and negatives noted in the HPI.    Physical Exam:    BP (!) 130/98   Pulse 89   Temp 99.3 °F  (37.4 °C) (Oral)   Resp 18   Ht 5' 3\" (1.6 m)   Wt 135 lb (61.2 kg)   LMP 01/26/2025 (Approximate)   SpO2 100%   BMI 23.91 kg/m²   General: No acute distress. Alert and oriented x 3.  HEENT: Normocephalic atraumatic. Moist mucous membranes. EOM-I. PERRLA. Anicteric.  Neck: No lymphadenopathy. No JVD. No carotid bruits.  Respiratory: Clear to auscultation bilaterally. No wheezes. No rhonchi.  Cardiovascular: S1, S2. Regular rate and rhythm. No murmurs, rubs or gallops. Equal pulses.   Chest and Back: No tenderness or deformity.  Abdomen: Soft, nontender, nondistended.  Positive bowel sounds. No rebound, guarding or organomegaly.  Neurologic: No focal neurological deficits. CNII-XII grossly intact.  Musculoskeletal: Moves all extremities.  Extremities: No edema or cyanosis.  Integument: No rashes or lesions.   Psychiatric: Appropriate mood and affect.    Diagnostic Data:      Labs:  Recent Labs   Lab 01/27/25 1941 01/30/25  2229   WBC 12.5* 14.8*   HGB 14.9 13.8   MCV 86.0 85.0   .0 356.0   INR  --  1.01       Recent Labs   Lab 01/27/25 1941 01/30/25  2234   * 121*   BUN <5* 13   CREATSERUM 0.77 1.01   CA 10.0 10.1   ALB 5.5* 4.9*   * 132*   K 4.5 2.5*   CL 99 94*   CO2 17.0* 26.0   ALKPHO 87 76   AST 65* 25   ALT 86* 42   BILT 1.2 0.8   TP 8.8* 7.6       Estimated Creatinine Clearance: 61.2 mL/min (based on SCr of 1.01 mg/dL).    Recent Labs   Lab 01/30/25  2229   PTP 13.1   INR 1.01       No results for input(s): \"TROP\", \"CK\" in the last 168 hours.    Imaging: Imaging data reviewed in [facility].  XR CHEST AP PORTABLE  (CPT=71045)    Result Date: 1/30/2025  CONCLUSION:  See above.   LOCATION:  [place]      Dictated by (CST): Haile Jauregui MD on 1/30/2025 at 11:15 PM     Finalized by (CST): Haile Jauregui MD on 1/30/2025 at 11:16 PM          ASSESSMENT / PLAN:     # Gastroenteritis   - CLD, ADAT   - IVF, antiemetics PRN   - IV PPI BID   - GI consulted   - GI PCR, C diff ordered     # Hypothyroidism -  continue home levothyroxine   # Hyponatremia - IVF, trend chem  # Hypokalemia - replace per protocol   # Pulmonary nodule - noted on prior CT A/P, discussed finding with pt and , she plans for f/u with PCP for monitoring.       Code Status: Not on file    Plan of care discussed with patient, ED physician    Eben Gaona MD  1/30/2025        Supplementary Documentation:      MDM : Patient's ER labs, imaging reviewed.  AM labs ordered.  ER management discussed with ED physician, decision made for patient to be admitted to the hospital for further medical management.                  [1]   Allergies  Allergen Reactions    Ceclor [Cefaclor] RASH   [2]   Current Facility-Administered Medications on File Prior to Encounter   Medication Dose Route Frequency Provider Last Rate Last Admin    [COMPLETED] ondansetron (Zofran-ODT) disintegrating tab 4 mg  4 mg Oral Once Ceci Hardwick MD   4 mg at 01/27/25 1732    [COMPLETED] sodium chloride 0.9 % IV bolus 1,000 mL  1,000 mL Intravenous Once Ceci Hardwick MD   Stopped at 01/27/25 2042    [COMPLETED] ondansetron (Zofran) 4 MG/2ML injection 4 mg  4 mg Intravenous Once Ceci Hardwick MD   4 mg at 01/27/25 1946    [COMPLETED] ketorolac (Toradol) 15 MG/ML injection 15 mg  15 mg Intravenous Once Ceci Hardwick MD   15 mg at 01/27/25 2031    [COMPLETED] LORazepam (Ativan) 2 mg/mL injection 1 mg  1 mg Intravenous Once Ceci Hardwick MD   1 mg at 01/27/25 2031    [COMPLETED] sodium chloride 0.9 % IV bolus 1,000 mL  1,000 mL Intravenous Once Ceci Hardwick MD   Stopped at 01/27/25 2243    [COMPLETED] diphenhydrAMINE (Benadryl) 50 mg/mL  injection 50 mg  50 mg Intravenous Once Ceci Hardwick MD   50 mg at 01/27/25 2051    [COMPLETED] metoclopramide (Reglan) 5 mg/mL injection 10 mg  10 mg Intravenous Once Ceci Hardwick MD   10 mg at 01/27/25 2051    [COMPLETED] iopamidol (ISOVUE-370) 76 % injection 85 mL  85 mL Intravenous ONCE PRN Ceci Hardwick MD   85 mL at 01/27/25  2212    [COMPLETED] diphenhydrAMINE (Benadryl) 50 mg/mL  injection 25 mg  25 mg Intravenous Once Ceci Hardwick MD   25 mg at 01/27/25 2314    [COMPLETED] LORazepam (Ativan) 2 mg/mL injection 1 mg  1 mg Intravenous Once Ceci Hardwick MD   1 mg at 01/27/25 2314    [COMPLETED] haloperidol lactate (Haldol) 5 MG/ML injection 5 mg  5 mg Intravenous Once Ceci Hardwick MD   5 mg at 01/27/25 2314     Current Outpatient Medications on File Prior to Encounter   Medication Sig Dispense Refill    sulfamethoxazole-trimethoprim -160 MG Oral Tab per tablet Take 1 tablet by mouth 2 (two) times daily for 5 days. 10 tablet 0    metoclopramide 10 MG Oral Tab Take 1 tablet (10 mg total) by mouth 3 (three) times daily as needed. 30 tablet 0    famotidine 40 MG Oral Tab Take 1 tablet (40 mg total) by mouth daily as needed for Heartburn. 30 tablet 0    ondansetron 4 MG Oral Tablet Dispersible Take 1 tablet (4 mg total) by mouth every 4 (four) hours as needed for Nausea. 10 tablet 0    zolpidem 10 MG Oral Tab Take 1 tablet (10 mg total) by mouth nightly as needed for Sleep. 30 tablet 1    QUEtiapine (SEROQUEL) 100 MG Oral Tab Take 1.5 tablets (150 mg total) by mouth nightly. 135 tablet 0    clonazePAM 1 MG Oral Tab Take 1 tablet (1 mg total) by mouth 3 (three) times daily as needed for Anxiety. 90 tablet 1    levothyroxine 25 MCG Oral Tab Take 1 tablet (25 mcg total) by mouth before breakfast. 90 tablet 0    Norethindrone Acet-Ethinyl Est (URI 1/20) 1-20 MG-MCG Oral Tab Take 1 tablet by mouth daily.

## 2025-01-31 NOTE — ANESTHESIA POSTPROCEDURE EVALUATION
[facility]    Mattie Zheng Patient Status:  Observation   Age/Gender 39 year old female MRN [id number]   Location [facility] 3NE-A Attending Eben Gaona, *   Hosp Day # 0 PCP Ingris Farias DO       Anesthesia Post-op Note    ESOPHAGOGASTRODUODENOSCOPY (EGD)    Procedure Summary       Date: 01/31/25 Room / Location:  ENDOSCOPY 03 / [facility]    Anesthesia Start: 1204 Anesthesia Stop: 1218    Procedure: ESOPHAGOGASTRODUODENOSCOPY (EGD) Diagnosis: (ESOPHAGITIS, HIATAL HERNIA)    Surgeons: Matthew Taveras MD Anesthesiologist: Kaity Butt MD    Anesthesia Type: MAC ASA Status: 2            Anesthesia Type: MAC    Vitals Value Taken Time   /84 01/31/25 1240   Temp 98 01/31/25 1439   Pulse 89 01/31/25 1247   Resp 12 01/31/25 1247   SpO2 100 % 01/31/25 1247   Vitals shown include unfiled device data.        Patient Location: Endoscopy    Anesthesia Type: MAC    Airway Patency: patent    Postop Pain Control: adequate    Mental Status: mildly sedated but able to meaningfully participate in the post-anesthesia evaluation    Nausea/Vomiting: none    Cardiopulmonary/Hydration status: stable euvolemic    Complications: no apparent anesthesia related complications    Postop vital signs: stable    Dental Exam: Unchanged from Preop    Patient to be discharged from PACU when criteria met.

## 2025-01-31 NOTE — CONSULTS
Consultation Note        Mattie Zheng Patient Status:  Observation    1985 MRN [id number]   [facility] 3NE-A Attending Eben Gaona, *   Hosp Day # 0 PCP Ingris Farias DO       Reason for Consultation   Vomiting, hematemesis       History of Present Illness   Ms Zheng is a 39 year old female with a history of hypothyroidism who presents with acute vomiting and hematemesis. About 5 days ago, soon after being around a child who was having GI symptoms, she started to have acute nausea and non-bloody vomiting. She has been having many episodes of vomiting, and has had a few episodes of bloody red streaks in her vomit. She endorses throat pain after the vomiting, but denies dysphagia, odynophagia, heartburn, abdominal pain, rectal bleeding, NSAID/AC use, and history of EGD. She doesn't use marijuana. She had a couple days of loose stools when her symptoms started but those are gone now.         PMH/MEDS/ALLERGIES/SH/FH:     Past Medical History:    Dysmenorrhea    Hypothyroidism      History reviewed. No pertinent surgical history.     No recently discontinued medications to reconcile   Medications Ordered Prior to Encounter[1]    Current Allergies: Allergies[2]    Social History     Occupational History    Not on file   Tobacco Use    Smoking status: Never     Passive exposure: Never    Smokeless tobacco: Never   Vaping Use    Vaping status: Never Used   Substance and Sexual Activity    Alcohol use: Not Currently    Drug use: Never    Sexual activity: Not on file       Family History   Problem Relation Age of Onset    Hypertension Father               MEDICATIONS      clonazePAM (KlonoPIN) tab 1 mg  1 mg Oral TID PRN    pantoprazole (Protonix) 40 mg in sodium chloride 0.9% PF 10 mL IV push  40 mg Intravenous Q12H    levothyroxine (Synthroid) tab 25 mcg  25 mcg Oral Before breakfast    QUEtiapine (SEROquel) tab 150 mg  150 mg Oral Nightly    zolpidem (Ambien) tab 10 mg  10  mg Oral Nightly PRN    lactated ringers infusion   Intravenous Continuous    acetaminophen (Tylenol Extra Strength) tab 1,000 mg  1,000 mg Oral Q8H PRN    melatonin tab 3 mg  3 mg Oral Nightly PRN    polyethylene glycol (PEG 3350) (Miralax) 17 g oral packet 17 g  17 g Oral Daily PRN    sennosides (Senokot) tab 17.2 mg  17.2 mg Oral Nightly PRN    bisacodyl (Dulcolax) 10 MG rectal suppository 10 mg  10 mg Rectal Daily PRN    ondansetron (Zofran) 4 MG/2ML injection 4 mg  4 mg Intravenous Q6H PRN    prochlorperazine (Compazine) 10 MG/2ML injection 5 mg  5 mg Intravenous Q8H PRN    benzonatate (Tessalon) cap 200 mg  200 mg Oral TID PRN    guaiFENesin (Robitussin) 100 MG/5 ML oral liquid 200 mg  200 mg Oral Q4H PRN    glycerin-hypromellose- (Artificial Tears) 0.2-0.2-1 % ophthalmic solution 1 drop  1 drop Both Eyes QID PRN    sodium chloride (Saline Mist) 0.65 % nasal solution 1 spray  1 spray Each Nare Q3H PRN    [COMPLETED] sodium chloride 0.9 % IV bolus 1,000 mL  1,000 mL Intravenous Once    [COMPLETED] metoclopramide (Reglan) 5 mg/mL injection 10 mg  10 mg Intravenous Once    [COMPLETED] diphenhydrAMINE (Benadryl) 50 mg/mL  injection 25 mg  25 mg Intravenous Once    [COMPLETED] LORazepam (Ativan) 2 mg/mL injection 0.5 mg  0.5 mg Intravenous Once    [COMPLETED] potassium chloride (Klor-Con M20) tab 40 mEq  40 mEq Oral Once    [COMPLETED] potassium chloride 20 mEq/100mL IVPB premix 20 mEq  20 mEq Intravenous Once    [COMPLETED] pantoprazole (Protonix) 40 mg in sodium chloride 0.9% PF 10 mL IV push  40 mg Intravenous Once              ROS:     A comprehensive 14 point review of systems was completed.  Pertinent positives and negatives noted in the the HPI.          Physical Exam     Vital signs:  /66 (BP Location: Left arm)   Pulse 112   Temp 98.1 °F (36.7 °C) (Oral)   Resp 18   Ht 5' 3\" (1.6 m)   Wt 135 lb (61.2 kg)   LMP 01/26/2025 (Approximate)   SpO2 98%   BMI 23.91 kg/m²         Physical Exam         General: Appears alert, oriented x 3 and in no acute distress. Tired appearing  HEENT: Normal. No scleral icterus.   NECK: Supple. No neck vein distention. Thyroid not enlarged.  CV: S1 and S2 normal. No murmurs or gallops.  LUNGS: Clear to percussion and auscultation.  ABDOMEN: Soft and non-distended. Non-tender. No masses. Bowel sounds are present.  BACK: No CVA tenderness.  EXTREMITIES: No edema, cyanosis or clubbing.  SKIN: Warm and dry.         IMAGING/LABS       Labs:   Lab Results   Component Value Date    WBC 14.8 01/30/2025    HGB 13.8 01/30/2025    HCT 37.4 01/30/2025    .0 01/30/2025    CREATSERUM 1.01 01/30/2025    BUN 13 01/30/2025     01/30/2025    K 2.5 01/30/2025    CL 94 01/30/2025    CO2 26.0 01/30/2025     01/30/2025    CA 10.1 01/30/2025    ALB 4.9 01/30/2025    ALKPHO 76 01/30/2025    BILT 0.8 01/30/2025    AST 25 01/30/2025    ALT 42 01/30/2025    PTT 25.1 01/30/2025    INR 1.01 01/30/2025    PTP 13.1 01/30/2025    LIP 43 01/30/2025    MG 2.0 01/30/2025     Recent Labs   Lab 01/27/25  1941 01/30/25  2234   * 121*   BUN <5* 13   CREATSERUM 0.77 1.01   CA 10.0 10.1   * 132*   K 4.5 2.5*   CL 99 94*   CO2 17.0* 26.0     Recent Labs   Lab 01/30/25  2229   RBC 4.40   HGB 13.8   HCT 37.4   MCV 85.0   MCH 31.4   MCHC 36.9   RDW 13.0   NEPRELIM 10.80*   WBC 14.8*   .0       Recent Labs   Lab 01/27/25  1941 01/30/25  2234   ALT 86* 42   AST 65* 25       Imaging:   XR CHEST AP PORTABLE  (CPT=71045)  Narrative: PROCEDURE:  XR CHEST AP PORTABLE  (CPT=71045)     TECHNIQUE:  AP chest radiograph was obtained.     COMPARISON:  None.     INDICATIONS:  vomiting blood     PATIENT STATED HISTORY: (As transcribed by Technologist)  Patient complains of vomiting, body aches, and chills for the last week. Patient was seen here 4 days ago and diagnosed with cyclic vomiting from marijuana. Patient states she vomited \"blood\"   several times in the last several days. Patient  states vomit color changes from \"brown to red.\" Patient states she is having difficulty keeping fluids down. Patient tested negative for flu and COVID-19 earlier this week.         FINDINGS:  Heart size is within normal limits.  Pleural spaces appear clear.  Mediastinal and hilar contours are normal.  No focal consolidation.  If clinical symptoms persist then recommend follow-up imaging.                   Impression: CONCLUSION:  See above.        LOCATION:  [facility]                 Dictated by (CST): Haile Jauregui MD on 1/30/2025 at 11:15 PM       Finalized by (CST): Haile Jauregui MD on 1/30/2025 at 11:16 PM               IMPRESSION:   Ms Zheng is a 39 year old female with a history of hypothyroidism who presents with acute vomiting and hematemesis. Her acute vomiting is likely secondary to an infectious gastroenteritis, and Ddx for her hematemesis includes Tamiko Trevizo tear, esophagitis, PUD, or AVM/Dieulafoy.             PLAN:   -NPO  -Upper Endoscopy with the assistance of MAC anesthesia on 1/31 for further evaluation-the risks, benefits and alternatives were discussed, including the risk of the preparation, anesthesia and the risk of perforation and bleeding with the procedure itself.  The risks of not proceeding were also discussed, including the risks of missing lesions including polyps or masses. The patient expresses an understanding and agrees to proceed as planned.   -pain control, anti-emetics per primary  -IV PPI BID  -f/u GI PCR, C diff if she is able to provide sample           Matthew Taveras MD  11:25 AM  1/31/2025  [facility]  [phone]                  [1]   No current facility-administered medications on file prior to encounter.     Current Outpatient Medications on File Prior to Encounter   Medication Sig Dispense Refill    metoclopramide 10 MG Oral Tab Take 1 tablet (10 mg total) by mouth 3 (three) times daily as needed. 30 tablet 0    famotidine 40 MG Oral Tab Take 1 tablet (40 mg  total) by mouth daily as needed for Heartburn. 30 tablet 0    ondansetron 4 MG Oral Tablet Dispersible Take 1 tablet (4 mg total) by mouth every 4 (four) hours as needed for Nausea. 10 tablet 0    zolpidem 10 MG Oral Tab Take 1 tablet (10 mg total) by mouth nightly as needed for Sleep. 30 tablet 1    QUEtiapine (SEROQUEL) 100 MG Oral Tab Take 1.5 tablets (150 mg total) by mouth nightly. 135 tablet 0    clonazePAM 1 MG Oral Tab Take 1 tablet (1 mg total) by mouth 3 (three) times daily as needed for Anxiety. 90 tablet 1    sulfamethoxazole-trimethoprim -160 MG Oral Tab per tablet Take 1 tablet by mouth 2 (two) times daily for 5 days. 10 tablet 0    levothyroxine 25 MCG Oral Tab Take 1 tablet (25 mcg total) by mouth before breakfast. 90 tablet 0    Norethindrone Acet-Ethinyl Est (URI 1/20) 1-20 MG-MCG Oral Tab Take 1 tablet by mouth daily.     [2]   Allergies  Allergen Reactions    Ceclor [Cefaclor] RASH

## 2025-01-31 NOTE — DISCHARGE INSTRUCTIONS
Please take pantoprazole 40 milligrams twice a day to heal your esophagitis. We will contact you about GI follow up.

## 2025-03-17 DIAGNOSIS — F32.9 MAJOR DEPRESSIVE DISORDER WITH CURRENT ACTIVE EPISODE, UNSPECIFIED DEPRESSION EPISODE SEVERITY, UNSPECIFIED WHETHER RECURRENT: ICD-10-CM

## 2025-03-18 NOTE — TELEPHONE ENCOUNTER
Requested Prescriptions     Pending Prescriptions Disp Refills    levothyroxine 25 MCG Oral Tab 90 tablet 0     Sig: Take 1 tablet (25 mcg total) by mouth before breakfast.       LOV: 4/2/24  RTC:   Last Relevant Labs: 5/31/24  Filled: 7/12/24 #90 with 0 refills    Future Appointments   Date Time Provider Department Center   5/7/2025  1:00 PM Lourdes Wagoner APRN [id number]        Please call patient and have her make appointment. A Tivity message was sent in July for her to make appointment that she never read.    Thanks!

## 2025-04-02 RX ORDER — LEVOTHYROXINE SODIUM 25 UG/1
25 TABLET ORAL
Qty: 30 TABLET | Refills: 0 | Status: SHIPPED | OUTPATIENT
Start: 2025-04-02

## 2025-04-02 NOTE — TELEPHONE ENCOUNTER
Future Appointments   Date Time Provider Department Center   4/15/2025  3:00 PM Hiram Iniguez MD [facility] [address] [facility] [address]   5/7/2025  1:00 PM Lourdes Wagoner APRN [id number] [id number]     #30 sent

## 2025-06-23 ENCOUNTER — HOSPITAL ENCOUNTER (OUTPATIENT)
Facility: HOSPITAL | Age: 40
Setting detail: OBSERVATION
Discharge: HOME | End: 2025-06-25
Attending: EMERGENCY MEDICINE | Admitting: STUDENT IN AN ORGANIZED HEALTH CARE EDUCATION/TRAINING PROGRAM
Payer: COMMERCIAL

## 2025-06-23 ENCOUNTER — HOSPITAL ENCOUNTER (INPATIENT)
Facility: HOSPITAL | Age: 40
LOS: 1 days | Discharge: HOME | End: 2025-06-25
Attending: EMERGENCY MEDICINE | Admitting: STUDENT IN AN ORGANIZED HEALTH CARE EDUCATION/TRAINING PROGRAM
Payer: COMMERCIAL

## 2025-06-23 DIAGNOSIS — K92.0 COFFEE GROUND EMESIS: ICD-10-CM

## 2025-06-23 DIAGNOSIS — R10.9 ABDOMINAL PAIN, ACUTE: ICD-10-CM

## 2025-06-23 DIAGNOSIS — R11.2 INTRACTABLE NAUSEA AND VOMITING: Primary | ICD-10-CM

## 2025-06-23 DIAGNOSIS — Z87.19 HISTORY OF ESOPHAGITIS: ICD-10-CM

## 2025-06-23 DIAGNOSIS — E87.6 HYPOKALEMIA: ICD-10-CM

## 2025-06-23 LAB
ALBUMIN SERPL-MCNC: 5.7 G/DL (ref 3.2–4.8)
ALBUMIN/GLOB SERPL: 1.9 {RATIO} (ref 1–2)
ALP LIVER SERPL-CCNC: 103 U/L (ref 37–98)
ALT SERPL-CCNC: 24 U/L (ref 10–49)
ANION GAP SERPL CALC-SCNC: 21 MMOL/L (ref 0–18)
AST SERPL-CCNC: 24 U/L (ref ?–34)
BASOPHILS # BLD AUTO: 0.03 X10(3) UL (ref 0–0.2)
BASOPHILS NFR BLD AUTO: 0.2 %
BILIRUB SERPL-MCNC: 1.1 MG/DL (ref 0.3–1.2)
BUN BLD-MCNC: 8 MG/DL (ref 9–23)
CALCIUM BLD-MCNC: 10.7 MG/DL (ref 8.7–10.6)
CHLORIDE SERPL-SCNC: 91 MMOL/L (ref 98–112)
CO2 SERPL-SCNC: 24 MMOL/L (ref 21–32)
CREAT BLD-MCNC: 1.15 MG/DL (ref 0.55–1.02)
EGFRCR SERPLBLD CKD-EPI 2021: 61 ML/MIN/1.73M2 (ref 60–?)
EOSINOPHIL # BLD AUTO: 0.05 X10(3) UL (ref 0–0.7)
EOSINOPHIL NFR BLD AUTO: 0.3 %
ERYTHROCYTE [DISTWIDTH] IN BLOOD BY AUTOMATED COUNT: 13.1 %
GLOBULIN PLAS-MCNC: 3 G/DL (ref 2–3.5)
GLUCOSE BLD-MCNC: 119 MG/DL (ref 70–99)
HCT VFR BLD AUTO: 41.4 % (ref 35–48)
HGB BLD-MCNC: 14.9 G/DL (ref 12–16)
IMM GRANULOCYTES # BLD AUTO: 0.06 X10(3) UL (ref 0–1)
IMM GRANULOCYTES NFR BLD: 0.4 %
LIPASE SERPL-CCNC: 50 U/L (ref 12–53)
LYMPHOCYTES # BLD AUTO: 1.54 X10(3) UL (ref 1–4)
LYMPHOCYTES NFR BLD AUTO: 9 %
MCH RBC QN AUTO: 29.3 PG (ref 26–34)
MCHC RBC AUTO-ENTMCNC: 36 G/DL (ref 31–37)
MCV RBC AUTO: 81.3 FL (ref 80–100)
MONOCYTES # BLD AUTO: 1.03 X10(3) UL (ref 0.1–1)
MONOCYTES NFR BLD AUTO: 6 %
NEUTROPHILS # BLD AUTO: 14.41 X10 (3) UL (ref 1.5–7.7)
NEUTROPHILS # BLD AUTO: 14.41 X10(3) UL (ref 1.5–7.7)
NEUTROPHILS NFR BLD AUTO: 84.1 %
OSMOLALITY SERPL CALC.SUM OF ELEC: 281 MOSM/KG (ref 275–295)
PLATELET # BLD AUTO: 458 10(3)UL (ref 150–450)
POTASSIUM SERPL-SCNC: 3.1 MMOL/L (ref 3.5–5.1)
PROT SERPL-MCNC: 8.7 G/DL (ref 5.7–8.2)
RBC # BLD AUTO: 5.09 X10(6)UL (ref 3.8–5.3)
SODIUM SERPL-SCNC: 136 MMOL/L (ref 136–145)
WBC # BLD AUTO: 17.1 X10(3) UL (ref 4–11)

## 2025-06-24 ENCOUNTER — APPOINTMENT (OUTPATIENT)
Dept: CT IMAGING | Facility: HOSPITAL | Age: 40
End: 2025-06-24
Attending: EMERGENCY MEDICINE
Payer: COMMERCIAL

## 2025-06-24 ENCOUNTER — ANESTHESIA EVENT (OUTPATIENT)
Dept: ENDOSCOPY | Facility: HOSPITAL | Age: 40
End: 2025-06-24
Payer: COMMERCIAL

## 2025-06-24 PROBLEM — R10.9 ABDOMINAL PAIN, ACUTE: Status: ACTIVE | Noted: 2025-06-24

## 2025-06-24 PROBLEM — Z87.19 HISTORY OF ESOPHAGITIS: Status: ACTIVE | Noted: 2025-06-24

## 2025-06-24 PROBLEM — R11.2 INTRACTABLE NAUSEA AND VOMITING: Status: ACTIVE | Noted: 2025-06-24

## 2025-06-24 PROBLEM — K92.0 COFFEE GROUND EMESIS: Status: ACTIVE | Noted: 2025-06-24

## 2025-06-24 LAB
APTT PPP: 25.4 SECONDS (ref 23–36)
ATRIAL RATE: 89 BPM
B-HCG UR QL: NEGATIVE
BASOPHILS # BLD AUTO: 0.01 X10(3) UL (ref 0–0.2)
BASOPHILS # BLD AUTO: 0.02 X10(3) UL (ref 0–0.2)
BASOPHILS # BLD AUTO: 0.03 X10(3) UL (ref 0–0.2)
BASOPHILS NFR BLD AUTO: 0.1 %
BASOPHILS NFR BLD AUTO: 0.2 %
BASOPHILS NFR BLD AUTO: 0.2 %
BILIRUB UR QL CFM: NEGATIVE
CLARITY UR REFRACT.AUTO: CLEAR
COLOR UR AUTO: YELLOW
EOSINOPHIL # BLD AUTO: 0 X10(3) UL (ref 0–0.7)
EOSINOPHIL # BLD AUTO: 0.01 X10(3) UL (ref 0–0.7)
EOSINOPHIL # BLD AUTO: 0.05 X10(3) UL (ref 0–0.7)
EOSINOPHIL NFR BLD AUTO: 0 %
EOSINOPHIL NFR BLD AUTO: 0.1 %
EOSINOPHIL NFR BLD AUTO: 0.3 %
ERYTHROCYTE [DISTWIDTH] IN BLOOD BY AUTOMATED COUNT: 13.3 %
ERYTHROCYTE [DISTWIDTH] IN BLOOD BY AUTOMATED COUNT: 13.4 %
ERYTHROCYTE [DISTWIDTH] IN BLOOD BY AUTOMATED COUNT: 13.4 %
GLUCOSE UR STRIP.AUTO-MCNC: 100 MG/DL
HCT VFR BLD AUTO: 32.9 % (ref 35–48)
HCT VFR BLD AUTO: 33.1 % (ref 35–48)
HCT VFR BLD AUTO: 35.8 % (ref 35–48)
HGB BLD-MCNC: 11.6 G/DL (ref 12–16)
HGB BLD-MCNC: 11.8 G/DL (ref 12–16)
HGB BLD-MCNC: 12.4 G/DL (ref 12–16)
HYALINE CASTS #/AREA URNS AUTO: PRESENT /LPF
IMM GRANULOCYTES # BLD AUTO: 0.04 X10(3) UL (ref 0–1)
IMM GRANULOCYTES # BLD AUTO: 0.04 X10(3) UL (ref 0–1)
IMM GRANULOCYTES # BLD AUTO: 0.06 X10(3) UL (ref 0–1)
IMM GRANULOCYTES NFR BLD: 0.3 %
INR BLD: 1.02 (ref 0.8–1.2)
KETONES UR STRIP.AUTO-MCNC: >150 MG/DL
LEUKOCYTE ESTERASE UR QL STRIP.AUTO: NEGATIVE
LYMPHOCYTES # BLD AUTO: 1.6 X10(3) UL (ref 1–4)
LYMPHOCYTES # BLD AUTO: 2.3 X10(3) UL (ref 1–4)
LYMPHOCYTES # BLD AUTO: 2.54 X10(3) UL (ref 1–4)
LYMPHOCYTES NFR BLD AUTO: 12.7 %
LYMPHOCYTES NFR BLD AUTO: 14.6 %
LYMPHOCYTES NFR BLD AUTO: 17.4 %
MAGNESIUM SERPL-MCNC: 2 MG/DL (ref 1.6–2.6)
MCH RBC QN AUTO: 29.2 PG (ref 26–34)
MCH RBC QN AUTO: 29.4 PG (ref 26–34)
MCH RBC QN AUTO: 29.5 PG (ref 26–34)
MCHC RBC AUTO-ENTMCNC: 34.6 G/DL (ref 31–37)
MCHC RBC AUTO-ENTMCNC: 35 G/DL (ref 31–37)
MCHC RBC AUTO-ENTMCNC: 35.9 G/DL (ref 31–37)
MCV RBC AUTO: 82.3 FL (ref 80–100)
MCV RBC AUTO: 83.8 FL (ref 80–100)
MCV RBC AUTO: 84.4 FL (ref 80–100)
MONOCYTES # BLD AUTO: 0.79 X10(3) UL (ref 0.1–1)
MONOCYTES # BLD AUTO: 0.99 X10(3) UL (ref 0.1–1)
MONOCYTES # BLD AUTO: 1.56 X10(3) UL (ref 0.1–1)
MONOCYTES NFR BLD AUTO: 6.3 %
MONOCYTES NFR BLD AUTO: 7.5 %
MONOCYTES NFR BLD AUTO: 9 %
NEUTROPHILS # BLD AUTO: 10.15 X10 (3) UL (ref 1.5–7.7)
NEUTROPHILS # BLD AUTO: 10.15 X10(3) UL (ref 1.5–7.7)
NEUTROPHILS # BLD AUTO: 13.18 X10 (3) UL (ref 1.5–7.7)
NEUTROPHILS # BLD AUTO: 13.18 X10(3) UL (ref 1.5–7.7)
NEUTROPHILS # BLD AUTO: 9.84 X10 (3) UL (ref 1.5–7.7)
NEUTROPHILS # BLD AUTO: 9.84 X10(3) UL (ref 1.5–7.7)
NEUTROPHILS NFR BLD AUTO: 74.5 %
NEUTROPHILS NFR BLD AUTO: 75.7 %
NEUTROPHILS NFR BLD AUTO: 80.5 %
NITRITE UR QL STRIP.AUTO: NEGATIVE
P AXIS: 5 DEGREES
P-R INTERVAL: 142 MS
PH UR STRIP.AUTO: 6 [PH] (ref 5–8)
PLATELET # BLD AUTO: 321 10(3)UL (ref 150–450)
PLATELET # BLD AUTO: 341 10(3)UL (ref 150–450)
PLATELET # BLD AUTO: 343 10(3)UL (ref 150–450)
PROT UR STRIP.AUTO-MCNC: 300 MG/DL
PROTHROMBIN TIME: 13.5 SECONDS (ref 11.6–14.8)
Q-T INTERVAL: 378 MS
QRS DURATION: 80 MS
QTC CALCULATION (BEZET): 459 MS
R AXIS: 3 DEGREES
RBC # BLD AUTO: 3.95 X10(6)UL (ref 3.8–5.3)
RBC # BLD AUTO: 4 X10(6)UL (ref 3.8–5.3)
RBC # BLD AUTO: 4.24 X10(6)UL (ref 3.8–5.3)
SP GR UR STRIP.AUTO: 1.03 (ref 1–1.03)
T AXIS: 16 DEGREES
UROBILINOGEN UR STRIP.AUTO-MCNC: 2 MG/DL
VENTRICULAR RATE: 89 BPM
WBC # BLD AUTO: 12.6 X10(3) UL (ref 4–11)
WBC # BLD AUTO: 13.2 X10(3) UL (ref 4–11)
WBC # BLD AUTO: 17.4 X10(3) UL (ref 4–11)

## 2025-06-24 PROCEDURE — 74177 CT ABD & PELVIS W/CONTRAST: CPT | Performed by: EMERGENCY MEDICINE

## 2025-06-24 PROCEDURE — 99223 1ST HOSP IP/OBS HIGH 75: CPT | Performed by: STUDENT IN AN ORGANIZED HEALTH CARE EDUCATION/TRAINING PROGRAM

## 2025-06-24 PROCEDURE — 71260 CT THORAX DX C+: CPT | Performed by: EMERGENCY MEDICINE

## 2025-06-24 RX ORDER — LORAZEPAM 2 MG/ML
1 INJECTION INTRAMUSCULAR ONCE
Status: COMPLETED | OUTPATIENT
Start: 2025-06-24 | End: 2025-06-24

## 2025-06-24 RX ORDER — PROCHLORPERAZINE EDISYLATE 5 MG/ML
5 INJECTION INTRAMUSCULAR; INTRAVENOUS EVERY 8 HOURS PRN
Status: DISCONTINUED | OUTPATIENT
Start: 2025-06-24 | End: 2025-06-25

## 2025-06-24 RX ORDER — SODIUM CHLORIDE, SODIUM LACTATE, POTASSIUM CHLORIDE, CALCIUM CHLORIDE 600; 310; 30; 20 MG/100ML; MG/100ML; MG/100ML; MG/100ML
INJECTION, SOLUTION INTRAVENOUS CONTINUOUS
Status: DISCONTINUED | OUTPATIENT
Start: 2025-06-24 | End: 2025-06-25

## 2025-06-24 RX ORDER — ZOLPIDEM TARTRATE 5 MG/1
10 TABLET ORAL NIGHTLY PRN
Status: DISCONTINUED | OUTPATIENT
Start: 2025-06-24 | End: 2025-06-25

## 2025-06-24 RX ORDER — LORAZEPAM 2 MG/ML
1 INJECTION INTRAMUSCULAR EVERY 6 HOURS PRN
Status: DISCONTINUED | OUTPATIENT
Start: 2025-06-24 | End: 2025-06-25

## 2025-06-24 RX ORDER — POTASSIUM CHLORIDE 14.9 MG/ML
20 INJECTION INTRAVENOUS ONCE
Status: COMPLETED | OUTPATIENT
Start: 2025-06-24 | End: 2025-06-24

## 2025-06-24 RX ORDER — CLONAZEPAM 1 MG/1
1 TABLET ORAL 3 TIMES DAILY PRN
Status: DISCONTINUED | OUTPATIENT
Start: 2025-06-24 | End: 2025-06-24

## 2025-06-24 RX ORDER — ECHINACEA PURPUREA EXTRACT 125 MG
1 TABLET ORAL
Status: DISCONTINUED | OUTPATIENT
Start: 2025-06-24 | End: 2025-06-25

## 2025-06-24 RX ORDER — ONDANSETRON 2 MG/ML
4 INJECTION INTRAMUSCULAR; INTRAVENOUS EVERY 6 HOURS PRN
Status: DISCONTINUED | OUTPATIENT
Start: 2025-06-24 | End: 2025-06-25

## 2025-06-24 RX ORDER — LORAZEPAM 2 MG/ML
0.5 INJECTION INTRAMUSCULAR ONCE
Status: DISCONTINUED | OUTPATIENT
Start: 2025-06-24 | End: 2025-06-24

## 2025-06-24 RX ORDER — DIPHENHYDRAMINE HYDROCHLORIDE 50 MG/ML
25 INJECTION, SOLUTION INTRAMUSCULAR; INTRAVENOUS ONCE
Status: COMPLETED | OUTPATIENT
Start: 2025-06-24 | End: 2025-06-24

## 2025-06-24 RX ORDER — METOCLOPRAMIDE HYDROCHLORIDE 5 MG/ML
10 INJECTION INTRAMUSCULAR; INTRAVENOUS ONCE
Status: COMPLETED | OUTPATIENT
Start: 2025-06-24 | End: 2025-06-24

## 2025-06-24 RX ORDER — BENZONATATE 100 MG/1
200 CAPSULE ORAL 3 TIMES DAILY PRN
Status: DISCONTINUED | OUTPATIENT
Start: 2025-06-24 | End: 2025-06-25

## 2025-06-24 RX ORDER — ACETAMINOPHEN 500 MG
500 TABLET ORAL EVERY 4 HOURS PRN
Status: DISCONTINUED | OUTPATIENT
Start: 2025-06-24 | End: 2025-06-25

## 2025-06-24 NOTE — ANESTHESIA PREPROCEDURE EVALUATION
PRE-OP EVALUATION    Patient Name: Mattie Zheng    Admit Diagnosis: Coffee ground emesis [K92.0]  Abdominal pain, acute [R10.9]  History of esophagitis [Z87.19]  Intractable nausea and vomiting [R11.2]    Pre-op Diagnosis: INPATIENT    ESOPHAGOGASTRODUODENOSCOPY (EGD)    Anesthesia Procedure: ESOPHAGOGASTRODUODENOSCOPY (EGD)    Surgeons and Role:     * Matthew Taveras MD - Primary    Pre-op vitals reviewed.  Temp: 97.9 °F (36.6 °C)  Pulse: 93  Resp: 14  BP: 130/90  SpO2: 99 %  Body mass index is 24.8 kg/m².    Current medications reviewed.  Hospital Medications:  Current Medications[1]    Outpatient Medications:   Prescriptions Prior to Admission[2]    Allergies: Ceclor [cefaclor]      Anesthesia Evaluation        Anesthetic Complications           GI/Hepatic/Renal      (+) GERD                           Cardiovascular      ECG reviewed.                                                 Endo/Other           (+) hypothyroidism                       Pulmonary                           Neuro/Psych      (+) depression  (+) anxiety                      Abdominal pain, acute Bipolar disorder, current episode mixed, severe, with psychotic features (HCC)  Coffee ground emesis Generalized anxiety disorder  History of esophagitis Hyperglycemia  Hypokalemia Hyponatremia  Hypothyroidism Intractable nausea and vomiting  Leukocytosis Major depressive disorder with current active episode  Major depressive disorder, recurrent severe without psychotic features (HCC) Vomiting without nausea, unspecified vomiting type            Past Surgical History[3]  Social Hx on file[4]  History   Drug Use Unknown     Available pre-op labs reviewed.  Lab Results   Component Value Date    WBC 17.4 (H) 06/24/2025    RBC 4.00 06/24/2025    HGB 11.8 (L) 06/24/2025    HCT 32.9 (L) 06/24/2025    MCV 82.3 06/24/2025    MCH 29.5 06/24/2025    MCHC 35.9 06/24/2025    RDW 13.3 06/24/2025    .0 06/24/2025     Lab Results   Component Value Date    NA  136 06/23/2025    K 3.1 (L) 06/23/2025    CL 91 (L) 06/23/2025    CO2 24.0 06/23/2025    BUN 8 (L) 06/23/2025    CREATSERUM 1.15 (H) 06/23/2025     (H) 06/23/2025    CA 10.7 (H) 06/23/2025     Lab Results   Component Value Date    INR 1.02 06/24/2025         Airway      Mallampati: III  Mouth opening: 3 FB  TM distance: 4 - 6 cm  Neck ROM: full Cardiovascular      Rhythm: regular  Rate: normal     Dental    Dentition appears grossly intact         Pulmonary      Breath sounds clear to auscultation bilaterally.               Other findings              ASA: 3   Plan: MAC  NPO status verified and patient meets guidelines.    Post-procedure pain management plan discussed with surgeon and patient.      Plan/risks discussed with: patient                Present on Admission:  **None**             [1]    [COMPLETED] metoclopramide (Reglan) 5 mg/mL injection 10 mg  10 mg Intravenous Once    [COMPLETED] diphenhydrAMINE (Benadryl) 50 mg/mL  injection 25 mg  25 mg Intravenous Once    [COMPLETED] sodium chloride 0.9 % IV bolus 1,000 mL  1,000 mL Intravenous Once    [COMPLETED] pantoprazole (Protonix) 40 mg in sodium chloride 0.9% PF 10 mL IV push  40 mg Intravenous Once    [COMPLETED] iopamidol 76% (ISOVUE-370) injection for power injector  80 mL Intravenous ONCE PRN    [COMPLETED] potassium chloride 20 mEq/100mL IVPB premix 20 mEq  20 mEq Intravenous Once    pantoprazole (Protonix) 40 mg in sodium chloride 0.9% PF 10 mL IV push  40 mg Intravenous Q12H    lactated ringers infusion   Intravenous Continuous    acetaminophen (Tylenol Extra Strength) tab 500 mg  500 mg Oral Q4H PRN    melatonin tab 3 mg  3 mg Oral Nightly PRN    ondansetron (Zofran) 4 MG/2ML injection 4 mg  4 mg Intravenous Q6H PRN    prochlorperazine (Compazine) 10 MG/2ML injection 5 mg  5 mg Intravenous Q8H PRN    benzonatate (Tessalon) cap 200 mg  200 mg Oral TID PRN    glycerin-hypromellose- (Artificial Tears) 0.2-0.2-1 % ophthalmic solution 1  drop  1 drop Both Eyes QID PRN    sodium chloride (Saline Mist) 0.65 % nasal solution 1 spray  1 spray Each Nare Q3H PRN    [COMPLETED] LORazepam (Ativan) 2 mg/mL injection 1 mg  1 mg Intravenous Once    clonazePAM (KlonoPIN) tab 1 mg  1 mg Oral TID PRN    QUEtiapine (SEROquel) tab 150 mg  150 mg Oral Nightly    zolpidem (Ambien) tab 10 mg  10 mg Oral Nightly PRN   [2]   Medications Prior to Admission   Medication Sig Dispense Refill Last Dose/Taking    QUEtiapine (SEROQUEL) 100 MG Oral Tab Take 1.5 tablets (150 mg total) by mouth nightly. 135 tablet 0 2025    zolpidem 10 MG Oral Tab Take 1 tablet (10 mg total) by mouth nightly as needed for Sleep. 30 tablet 0 2025    clonazePAM 1 MG Oral Tab Take 1 tablet (1 mg total) by mouth 3 (three) times daily as needed for Anxiety. 90 tablet 0 2025    levothyroxine 25 MCG Oral Tab Take 1 tablet (25 mcg total) by mouth before breakfast. NEEDS LABS (Patient not taking: Reported on 2025) 30 tablet 0     [] clonazePAM 1 MG Oral Tab Take 1 tablet (1 mg total) by mouth 3 (three) times daily as needed for Anxiety. 90 tablet 1     [] pantoprazole 40 MG Oral Tab EC Take 1 tablet (40 mg total) by mouth 2 (two) times daily before meals. 120 tablet 0     metoclopramide 10 MG Oral Tab Take 1 tablet (10 mg total) by mouth 3 (three) times daily as needed. 30 tablet 0     Norethindrone Acet-Ethinyl Est (URI ) 1-20 MG-MCG Oral Tab Take 1 tablet by mouth daily.      [3] History reviewed. No pertinent surgical history.  [4]   Social History  Socioeconomic History    Marital status:    Tobacco Use    Smoking status: Never     Passive exposure: Never    Smokeless tobacco: Never   Vaping Use    Vaping status: Never Used   Substance and Sexual Activity    Alcohol use: Not Currently    Drug use: Never

## 2025-06-24 NOTE — ED PROVIDER NOTES
Patient Seen in: [facility] Emergency Department        History  Chief Complaint   Patient presents with    Nausea/Vomiting/Diarrhea     Stated Complaint: n/v, went to  this AM and was prescribed zofran without relief    Subjective:   HPI            Patient is a 40-year-old female presenting to the ED with nausea and vomiting, numerous episodes, in the last 24 hours.  The history is obtained from patient who is a good historian.  The patient reports that she had an EGD in January 2025 and was diagnosed with esophagitis and gastritis.  She was started on Protonix daily but discontinued it 2 or 3 weeks ago.  The patient reports that the symptoms returned and are similar to what she is experienced in the past with quite severe, pain described as a burning sensation within the chest and upper abdomen, associate with multiple episodes of nausea and vomiting that has now turned brown in color.  No gross hematemesis.  No recent black stool.  She did try taking Pepto-Bismol earlier today.  She is already seen at immediate care as well and was prescribed Protonix and Zofran.  She has continued to have multiple episodes of vomiting despite trying Zofran ODT at home.  Patient also states she feels anxious.  The patient denies any excessive alcohol or NSAID use.      Objective:     Past Medical History:    Dysmenorrhea    Esophageal reflux    Hypothyroidism              History reviewed. No pertinent surgical history.             Social History     Socioeconomic History    Marital status:    Tobacco Use    Smoking status: Never     Passive exposure: Never    Smokeless tobacco: Never   Vaping Use    Vaping status: Never Used   Substance and Sexual Activity    Alcohol use: Not Currently    Drug use: Never     Social Drivers of Health     Food Insecurity: No Food Insecurity (6/24/2025)    NCSS - Food Insecurity     Worried About Running Out of Food in the Last Year: No     Ran Out of Food in the Last Year: No    Transportation Needs: No Transportation Needs (6/24/2025)    NCSS - Transportation     Lack of Transportation: No   Housing Stability: Not At Risk (6/24/2025)    NCSS - Housing/Utilities     Has Housing: Yes     Worried About Losing Housing: No     Unable to Get Utilities: No                                Physical Exam    ED Triage Vitals   BP 06/23/25 2054 (!) 147/108   Pulse 06/23/25 2054 120   Resp 06/23/25 2054 20   Temp 06/23/25 2054 97.9 °F (36.6 °C)   Temp src 06/23/25 2054 Oral   SpO2 06/23/25 2300 100 %   O2 Device 06/23/25 2300 None (Room air)       Current Vitals:   Vital Signs  BP: 130/90  Pulse: 93  Resp: 14  Temp: 97.9 °F (36.6 °C)  Temp src: Oral  MAP (mmHg): (!) 102    Oxygen Therapy  SpO2: 99 %  O2 Device: None (Room air)            Physical Exam  Vitals and nursing note reviewed.   Constitutional:       General: She is not in acute distress.     Appearance: Normal appearance. She is well-developed. She is not ill-appearing or toxic-appearing.      Comments: Coffee-ground emesis noted in basin   HENT:      Head: Normocephalic and atraumatic.      Right Ear: External ear normal.      Left Ear: External ear normal.      Mouth/Throat:      Mouth: Mucous membranes are moist.      Pharynx: Oropharynx is clear.   Eyes:      Conjunctiva/sclera: Conjunctivae normal.   Cardiovascular:      Rate and Rhythm: Normal rate and regular rhythm.      Heart sounds: Normal heart sounds.   Pulmonary:      Effort: Pulmonary effort is normal.      Breath sounds: Normal breath sounds.   Abdominal:      General: Abdomen is flat. Bowel sounds are normal. There is no distension.      Palpations: Abdomen is soft.      Tenderness: There is abdominal tenderness. There is no guarding or rebound.      Comments: Epigastric tenderness   Musculoskeletal:      Right lower leg: No edema.      Left lower leg: No edema.   Skin:     General: Skin is warm.      Capillary Refill: Capillary refill takes less than 2 seconds.       Findings: No rash.   Neurological:      General: No focal deficit present.      Mental Status: She is alert and oriented to person, place, and time.   Psychiatric:         Behavior: Behavior normal.      Comments: Anxious                 ED Course  Labs Reviewed   COMP METABOLIC PANEL (14) - Abnormal; Notable for the following components:       Result Value    Glucose 119 (*)     Potassium 3.1 (*)     Chloride 91 (*)     Anion Gap 21 (*)     BUN 8 (*)     Creatinine 1.15 (*)     Calcium, Total 10.7 (*)     Alkaline Phosphatase 103 (*)     Total Protein 8.7 (*)     Albumin 5.7 (*)     All other components within normal limits   CBC WITH DIFFERENTIAL WITH PLATELET - Abnormal; Notable for the following components:    WBC 17.1 (*)     .0 (*)     Neutrophil Absolute Prelim 14.41 (*)     Neutrophil Absolute 14.41 (*)     Monocyte Absolute 1.03 (*)     All other components within normal limits   URINALYSIS WITH CULTURE REFLEX - Abnormal; Notable for the following components:    Glucose Urine 100 (*)     Ketones Urine >150 (*)     Blood Urine Trace (*)     Protein Urine 300 (*)     Urobilinogen Urine 2 (*)     Bacteria Urine Rare (*)     Squamous Epi. Cells Few (*)     Hyaline Casts Present (*)     All other components within normal limits   CBC WITH DIFFERENTIAL WITH PLATELET - Abnormal; Notable for the following components:    WBC 17.4 (*)     HGB 11.8 (*)     HCT 32.9 (*)     Neutrophil Absolute Prelim 13.18 (*)     Neutrophil Absolute 13.18 (*)     Monocyte Absolute 1.56 (*)     All other components within normal limits   LIPASE - Normal   MAGNESIUM - Normal   PROTHROMBIN TIME (PT) - Normal   PTT, ACTIVATED - Normal   ICTOTEST - Normal   POCT PREGNANCY URINE - Normal   CBC WITH DIFFERENTIAL WITH PLATELET   CBC WITH DIFFERENTIAL WITH PLATELET   RAINBOW DRAW LAVENDER   RAINBOW DRAW LIGHT GREEN            EKG    Rate, intervals and axes as noted on EKG Report.  Rate: 89  Rhythm: Sinus Rhythm  Reading: Nonspecific T  wave abnormality.  When compared to previous EKG from January 30, 2025, no significant change.                      MDM     History obtained from patient.     Differential diagnosis includes gastritis, esophagitis, peptic ulcer disease, viscus perforation, upper GI bleeding, Tamiko-Trevizo tear.    Previous records reviewed.  Patient hospitalized in May 2025.  Was seen by GI at that time.  Patient had an EGD which demonstrated grade D esophagitis with biopsies taken at that time.  Patient was placed on PPI twice daily indefinitely with plan for repeat EGD in 8 to 12 weeks.  It does not appear that the patient followed up for repeat EGD.    Testing considered and ordered includes CBC, CMP, lipase, coagulation studies, UCG, UA, CT scan of the chest abdomen and pelvis given the patient is complaining of severe pain.  Magnesium added due to low potassium levels.    I reviewed all results.  CMP reviewed with a glucose of 119, hypokalemia with potassium of 3.1.  Calcium at 10.7.  Creatinine of 1.15.  Bicarb normal at 24 however patient does have increased anion gap.  CBC reviewed with WBC of 17.1 and platelet count at 458.  UA reviewed with significant ketones present in the urine which could be contributing to acidosis.  Lipase normal.  UCG negative.  Coagulation studies normal.  Magnesium is normal.      I also reviewed the official report which shows   CTA chest with contrast  CT abdomen and pelvis with contrast    COMPARISON: None    IMPRESSION:  CT CHEST:  Adequate contrast bolus timing without evidence of pulmonary embolism.    No evidence of acute aortic injury on this nongated exam.    No focal lung consolidation.  No suspicious pulmonary nodule.  No pneumothorax or pleural effusion.  No acute bony abnormality.        CT ABDOMEN AND PELVIS:  No acute abnormality in the abdomen or pelvis.    No appendicitis.  No diverticulitis.  No acute biliary pathology.  No obstructive urolithiasis.  No SBO.    Hepatic  steatosis.    Others who [facility] in [facility] care [facility], further GI evaluation, IV hydration.    Interventions in care included patient was attempting to use Zofran at home without relief in symptoms.  She did respond to Reglan and Benadryl.  She was also given IV PPI, Protonix 40 mg IV.  Potassium was replaced IV, 20 mill equivalents.  Ativan was also ordered for patient's anxiety.  She received IV fluids.  Given that she was already seen in immediate care this morning and had subsequent emesis with significant abdominal pain and epigastric pain through the esophagus with failed outpatient treatment as well as coffee-ground emesis and significant history of esophagitis without any follow-up EGD will plan for hospitalization.  Plan discussed with patient.          Admission disposition: 6/24/2025  2:57 AM           Medical Decision Making      Disposition and Plan     Clinical Impression:  1. Intractable nausea and vomiting    2. Coffee ground emesis    3. Abdominal pain, acute    4. History of esophagitis    5. Hypokalemia         Disposition:  Admit  6/24/2025  2:57 am    Follow-up:  No follow-up provider specified.        Medications Prescribed:  Current Discharge Medication List                Supplementary Documentation:         Hospital Problems       Present on Admission  Date Reviewed: 6/4/2025          ICD-10-CM Noted POA    * (Principal) Intractable nausea and vomiting R11.2 6/24/2025 Unknown    Abdominal pain, acute R10.9 6/24/2025 Unknown    Coffee ground emesis K92.0 6/24/2025 Unknown    History of esophagitis Z87.19 6/24/2025 Unknown

## 2025-06-24 NOTE — PROGRESS NOTES
[facility]   part of [facility]     Hospitalist Progress Note     Mattie Zheng Patient Status:  Inpatient    1985 MRN [id number]   Location [facility] 3SW-A Attending Zain Wylie MD   Hosp Day # 0 PCP Hiram Iniguez MD     Chief Complaint: nausea    Subjective:     Patient hungry for the first time in many days.  He did not vomit overnight and denies any abdominal pain or diarrhea.  Says her endoscopy is around 11:00 today     Objective:    Review of Systems:   A 5 point comprehensive review of systems was completed; pertinent positive and negatives stated in subjective.    Vital signs:  Temp:  [97.9 °F (36.6 °C)] 97.9 °F (36.6 °C)  Pulse:  [] 93  Resp:  [11-20] 14  BP: (117-154)/() 130/90  SpO2:  [97 %-100 %] 99 %    Physical Exam:    General: No acute distress  Respiratory: no wheezes, no rhonchi  Cardiovascular: S1, S2, RRR  Abdomen: Soft, NT/ND, +BS  Neuro: No new focal deficits.   Extremities: no edema      Diagnostic Data:    Labs:  Recent Labs   Lab 25  0033 25  0553   WBC 17.1*  --  17.4*   HGB 14.9  --  11.8*   MCV 81.3  --  82.3   .0*  --  341.0   INR  --  1.02  --        Recent Labs   Lab 25   *   BUN 8*   CREATSERUM 1.15*   CA 10.7*   ALB 5.7*      K 3.1*   CL 91*   CO2 24.0   ALKPHO 103*   AST 24   ALT 24   BILT 1.1   TP 8.7*       Estimated Creatinine Clearance: 53.3 mL/min (A) (based on SCr of 1.15 mg/dL (H)).    Recent Labs   Lab 25  0033   PTP 13.5   INR 1.02            Microbiology    No results found for this visit on 25.      Imaging: Reviewed in Epic.    Medications: Scheduled Medications[1]    Assessment & Plan:      #Coffee ground emesis; IV PPI; EGD today; fluids; NPO  -avoid marijuana use    #hx esophagitis-ppi     #hypokalemia-replace per protocol prn     #elevated serum creatinine; resolved back to normal-IVF  above    #anxiety-cont home quetiapine, clonazepam     #insomnia-cont home  zolpidem     DVT Px: scds    Will probably need one more night    Plan of care discussed with patient     Macario Baer MD    Supplementary Documentation:     Quality:  DVT Mechanical Prophylaxis:   SCDs,    DVT Pharmacologic Prophylaxis   Medication   None              Code status; see chart  Humphreys: No urinary catheter in place  Humphreys Duration (in days):   Central line:    MICHAEL:     Discharge is dependent on: course  At this point Ms. Zheng is expected to be discharge to: home                        [1]    pantoprazole  40 mg Intravenous Q12H

## 2025-06-24 NOTE — PLAN OF CARE
NURSING ADMISSION NOTE      Patient admitted via Cart  Oriented to room.  Safety precautions initiated.  Bed in low position.  Call light in reach.   Admitted from ER with intractable vomiting . A&Ox4 , anxious , ativan given as ordered . 2 person skin check done with PCT Daiana . Skin intact . Iv fluid started . Reminded to call for any needs . Will monitor .

## 2025-06-24 NOTE — CONSULTS
[facility]                       Gastroenterology Consultation-[facility]    Mattie Zheng Patient Status:  Inpatient    1985 MRN [id number]   Location [facility] 3SW-A Attending Zain Wylie MD   Hosp Day # 0 PCP Hiram Iniguez MD     Reason for consultation: Coffee-ground emesis, abnormal esophageal imaging  HPI: This is a 40 yr-old female with PMhx that includes hypothyroidism, anxiety, and hematemesis s/p EGD (2025; Dr Taveras) revealing LA grade D esophagitis, 3 cm hiatal hernia who presented to ER overnight with persistent nausea + vomiting which progressed to coffee-ground emesis.    Patient reports recovering from her epigastric pain, persistent N/V, and hematemesis which required hospitalization in January and completed her course of PPI until ~3 weeks ago.  She was lost to follow-up despite certified letter as she felt her symptoms were \"cured\".  She reports a stable appetite without early satiety, weight loss, nausea/vomiting, or heartburn until  when she developed severe, constant epigastric burning which progressed to multiple episodes of vomiting which turned into coffee-ground emesis.  She denies diarrhea, melena, or hematochezia.  Patient reports using a dose of Pepto-Bismol without relief.  Chronically, patient uses NSAIDs--4 tablets of Aleve several times daily to help manage menstrual cramping which she develops every 3-4 weeks.  No EGD since 2025 as patient has been lost to follow-up and no history of undergoing a colonoscopy.  CT C/A/P IV suggests hepatic steatosis, fluid density within the esophagus and some mild esophageal wall thickening at GEJ, and stable lung nodule; labs persistent leukocytosis (WBC 17.4 from 12-14 when admitted in 2025), Hgb 11.8 from 14.9 on arrival with prior baseline 13-14, normal plt (341), normal INR (1.02), normal lipase (50), STANLEY (creat 1.15 with BUN normal 8), hypokalemia (3.1), and normal LFTs with exception of a mild  alk phos elevation (103).   Since admission, patient reports improved pain and nausea without any further episodes of overt GI bleeding.  PMHx: Past Medical History[1]             PSHx: Past Surgical History[2]  Medications: Current Medications[3]  Allergies: Allergies[4]  Social HX: Short Social Hx on File[5]   FamHx: The patient has no family history of colon cancer or other gastrointestinal malignancies;  No family history of ulcer disease, or inflammatory bowel disease  ROS:  In addition to the pertinent positives described above:            Infectious Disease:  No chronic infections or recent fevers prior to the acute illness            Cardiovascular: No history of CAD, prior MI, chest pain, or palpitations            Respiratory: No shortness of breath, asthma, copd, recurrent pneumonia            Hematologic: The patient reports no easy bruising, frequent gum bleeding or nose bleeding;  The patient has no history of known chronic anemia            Dermatologic: The patient reports no recent rashes or chronic skin disorders            Rheumatologic: The patient reports no history of chronic arthritis, myalgias, arthralgias            Genitourinary:  The patient reports no history of recurrent urinary tract infections, hematuria, dysuria, or nephrolithiasis           Psychiatric: + anxiety           Oncologic: The patient reports no history of prior solid tumor or hematologic malignancy           ENT: The patient reports no hoarseness of voice, hearing loss, sinus congestion, tinnitus           Neurologic: The patient reports no history of seizure, stroke, or frequent headaches  PE: /90   Pulse 93   Temp 97.9 °F (36.6 °C) (Oral)   Resp 14   Ht 5' 3\" (1.6 m)   Wt 140 lb (63.5 kg)   LMP 06/09/2025 (Approximate)   SpO2 99%   BMI 24.80 kg/m²   Gen: AAO x 3, able to speak in complete sentences  HENT: EOMI, PERRL, oropharynx is clear with moist mucosal membranes  Eyes: Sclerae are anicteric  Neck:   Supple without nuchal rigidity  CV: Regular rate and rhythm, with normal S1 and S2  Resp: Clear to auscultation bilaterally without wheezes; rubs, rhonchi, or rales  Abdomen: Soft, mild epigastric tenderness, non-distended with the presence of bowel sounds; No hepatosplenomegaly; no rebound or guarding; No ascites is clinically apparent; no tympany to percussion  Ext: No peripheral edema or cyanosis  Skin: Warm and dry  Psychiatric: Anxious   Labs:   Lab Results   Component Value Date    WBC 17.4 06/24/2025    HGB 11.8 06/24/2025    HCT 32.9 06/24/2025    .0 06/24/2025    CREATSERUM 1.15 06/23/2025    BUN 8 06/23/2025     06/23/2025    K 3.1 06/23/2025    CL 91 06/23/2025    CO2 24.0 06/23/2025     06/23/2025    CA 10.7 06/23/2025    ALB 5.7 06/23/2025    ALKPHO 103 06/23/2025    BILT 1.1 06/23/2025    AST 24 06/23/2025    ALT 24 06/23/2025    PTT 25.4 06/24/2025    INR 1.02 06/24/2025    PTP 13.5 06/24/2025    LIP 50 06/23/2025    MG 2.0 06/24/2025     Recent Labs   Lab 06/23/25 2126   *   BUN 8*   CREATSERUM 1.15*   CA 10.7*      K 3.1*   CL 91*   CO2 24.0     Recent Labs   Lab 06/24/25  0553   RBC 4.00   HGB 11.8*   HCT 32.9*   MCV 82.3   MCH 29.5   MCHC 35.9   RDW 13.3   NEPRELIM 13.18*   WBC 17.4*   .0       Recent Labs   Lab 06/23/25 2126   ALT 24   AST 24       Imaging:   PROCEDURE:  CT CHEST+ABDOMEN+PELVIS(ALL CNTRST ONLY)(CPT=71260/13868)     COMPARISON:  [place], CT, CT ABDOMEN+PELVIS(CONTRAST ONLY)(CPT=74177), 1/27/2025, 10:04 PM.     INDICATIONS:  Nausea and vomiting and burning sensation in the chest and abdomen.     TECHNIQUE:  IV contrast-enhanced scanning through the chest, abdomen, and pelvis was performed.  Dose reduction techniques were used. Dose information is transmitted to the ACR (American College of Radiology) NRDR (National Radiology Data Registry) which   includes the Dose Index Registry.     PATIENT STATED HISTORY:(As transcribed by  Technologist)   n/v, went to  this AM and was prescribed zofran without relief        CONTRAST USED:  90cc of Isovue 370     FINDINGS:       CHEST:    LUNGS:  Stable 4 mm noncalcified right lower lobe lung nodule seen on image 113 of series 3. No change from 1/27/2025.  No new lung nodules are identified.  The trachea and major bronchi are patent.  No infiltrate or consolidation.  There is no pulmonary   edema.  MEDIASTINUM:  No mass or adenopathy.  Air-fluid levels are seen within the esophagus.  Mild esophageal wall thickening noted distally.  DANIELLE:  No mass or adenopathy.    CARDIAC:  No enlargement, pericardial thickening, or significant coronary artery calcification.  PLEURA:  No pleural effusion or pneumothorax.  CHEST WALL:  No mass or axillary adenopathy.    AORTA:  No aneurysm or dissection.    VASCULATURE:  No visible pulmonary arterial thrombus or attenuation.       ABDOMEN/PELVIS:  LIVER:  No enlargement, atrophy, abnormal density, or significant focal lesion.  Focal fatty change noted near the falciform ligament.  BILIARY:  No visible dilatation or calcification.    PANCREAS:  No lesion, fluid collection, ductal dilatation, or atrophy.    SPLEEN:  No enlargement or focal lesion.    KIDNEYS:  No mass, obstruction, or calcification.  Small extrarenal pelves are noted bilaterally without caliceal dilatation.  No urinary tract calculi.  ADRENALS:  No mass or enlargement.    AORTA:  No aneurysm or dissection.    RETROPERITONEUM:  No mass or adenopathy.    BOWEL/MESENTERY:  No visible mass, obstruction, or bowel wall thickening.  The cecum is transversely oriented in the lower pelvis.  The appendix is in the left lower quadrant and has a normal appearance.  There is no free intraperitoneal air.  There is  no free fluid.  Numerous pills are demonstrated within the cecum.  ABDOMINAL WALL:  No mass or hernia.    URINARY BLADDER:  No visible focal wall thickening, lesion, or calculus.    PELVIC NODES:  No  adenopathy.    PELVIC ORGANS:  There appear to be 2 separate uterine canals and possible 2 separate cervical canals which raises suspicion for uterine didelphys or perhaps bicornuate uterus.  Correlate with speculum exam.  BONES:  Degenerative disc disease seen at L4-5.  No acute osseous abnormality identified.      Impression:     CONCLUSION:    1. There is fluid density within the esophagus and there is some mild esophageal wall thickening at the GE junction.  Findings could reflect esophagitis with gastroesophageal reflux, poor esophageal motility or distal esophageal stricture.  Recommend  correlation with endoscopic evaluation or esophagram.  2. Stable 4 mm noncalcified right lower lobe lung nodule.  Follow-up as per Fleischner criteria recommended.  3. 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  4. Hepatic steatosis.     The findings include a single, incidentally detected, solid pulmonary nodule, measuring less than 6mm.  2017 guidelines from the Fleischner Society for the follow-up and management of incidentally detected indeterminate pulmonary nodules in persons at  least 35 years of age depend on nodule size (average length and width) and underlying risk factors (including smoking and other risk factors). Please consider the following recommendations after clinical assessment of risk factors.  For <6mm solid  nodules: In low risk patients, no follow-up required.  If suspicious morphology or upper lobe location, consider 12 month follow-up.  In high risk patients, optional CT in 12 months.         LOCATION:  [facility]     Dictated by (CST): Mateusz Hough MD on 6/24/2025 at 7:09 AM      Finalized by (CST): Mateusz Hough MD on 6/24/2025 at 7:25 AM       Impression: 40 yr-old female with hx of hypothyroidism, anxiety, and hematemesis s/p EGD (1/2025; Dr Taveras) revealing LA grade D esophagitis, 3 cm hiatal hernia admitted overnight with persistent nausea +  vomiting which progressed to coffee-ground emesis. + NSAIDs.  CT C/A/P IV suggests hepatic steatosis, fluid density within the esophagus and some mild esophageal wall thickening at GEJ, and stable lung nodule; labs persistent leukocytosis (WBC 17.4 from 12-14 when admitted in January 2025), Hgb 11.8 from 14.9 on arrival with prior baseline 13-14, normal plt (341), normal INR (1.02), normal lipase (50), STANLEY (creat 1.15 with BUN normal 8), hypokalemia (3.1), and normal LFTs with exception of a mild alk phos elevation (103).   Will treat with PPI IV twice daily, monitor for overt GI bleeding, monitor Hgb transfusing as needed, and plan for EGD in a.m. to assess for esophagitis, ulcerations, Rashaun's erosions, AVMs, and unlikely neoplasm.   The risks, benefits, alternatives of the procedure including the risks of anesthesia, bleeding, perforation, missed lesions, need for surgery, and infection were discussed with the patient. She expressed understanding of the risks and was agreeable to proceed.    Recommendations:     Plan for EGD in AM under MAC with Dr Taveras  Clear liquid diet today; NPO after midnight with sips of water for necessary medications   Protonix 40 mg IV twice daily  Pain management per hospitalist recommendations holding non-ASA NSAIDs; antiemetics as needed  Monitor for overt GI bleeding--no need for occult testing  Serial Hgb monitoring transfusing as needed to maintain Hgb > 7  CBC, BMP, Mg in AM correcting electrolytes per Hospitalist recommendations and transfusing PRBC if needed    Thank you for the consultation, we will follow the patient with you.  Attending addendum (Dr Taveras) to follow later today and provide formal, final recommendations at that time   RENATO Swift  10:50 AM  6/24/2025  [facility]  [phone]    This patient was seen in conjunction with Marta Gold NP. I have personally seen and examined the patient and have discussed the above stated  diagnosis and treatment plan. The patient is a 40 year old female with a history of LA grade D esophagitis who presents with acute nausea and vomiting, with CT showing esophageal thickening; she also had coffee ground emesis. On exam, her nausea/vomiting have resolved and she feels well. Recommend IV PPI and EGD on 6/25 for further evaluation.    Matthew Taveras MD  [facility]             [1]   Past Medical History:   Dysmenorrhea    Esophageal reflux    Hypothyroidism   [2] History reviewed. No pertinent surgical history.  [3]    [COMPLETED] metoclopramide (Reglan) 5 mg/mL injection 10 mg  10 mg Intravenous Once    [COMPLETED] diphenhydrAMINE (Benadryl) 50 mg/mL  injection 25 mg  25 mg Intravenous Once    [COMPLETED] sodium chloride 0.9 % IV bolus 1,000 mL  1,000 mL Intravenous Once    [COMPLETED] pantoprazole (Protonix) 40 mg in sodium chloride 0.9% PF 10 mL IV push  40 mg Intravenous Once    [COMPLETED] iopamidol 76% (ISOVUE-370) injection for power injector  80 mL Intravenous ONCE PRN    [COMPLETED] potassium chloride 20 mEq/100mL IVPB premix 20 mEq  20 mEq Intravenous Once    pantoprazole (Protonix) 40 mg in sodium chloride 0.9% PF 10 mL IV push  40 mg Intravenous Q12H    lactated ringers infusion   Intravenous Continuous    acetaminophen (Tylenol Extra Strength) tab 500 mg  500 mg Oral Q4H PRN    melatonin tab 3 mg  3 mg Oral Nightly PRN    ondansetron (Zofran) 4 MG/2ML injection 4 mg  4 mg Intravenous Q6H PRN    prochlorperazine (Compazine) 10 MG/2ML injection 5 mg  5 mg Intravenous Q8H PRN    benzonatate (Tessalon) cap 200 mg  200 mg Oral TID PRN    glycerin-hypromellose- (Artificial Tears) 0.2-0.2-1 % ophthalmic solution 1 drop  1 drop Both Eyes QID PRN    sodium chloride (Saline Mist) 0.65 % nasal solution 1 spray  1 spray Each Nare Q3H PRN    [COMPLETED] LORazepam (Ativan) 2 mg/mL injection 1 mg  1 mg Intravenous Once    clonazePAM (KlonoPIN) tab 1 mg  1 mg Oral TID PRN    QUEtiapine  (SEROquel) tab 150 mg  150 mg Oral Nightly    zolpidem (Ambien) tab 10 mg  10 mg Oral Nightly PRN   [4]   Allergies  Allergen Reactions    Ceclor [Cefaclor] RASH   [5]   Social History  Socioeconomic History    Marital status:    Tobacco Use    Smoking status: Never     Passive exposure: Never    Smokeless tobacco: Never   Vaping Use    Vaping status: Never Used   Substance and Sexual Activity    Alcohol use: Not Currently    Drug use: Never     Social Drivers of Health     Food Insecurity: No Food Insecurity (6/24/2025)    NCSS - Food Insecurity     Worried About Running Out of Food in the Last Year: No     Ran Out of Food in the Last Year: No   Transportation Needs: No Transportation Needs (6/24/2025)    NCSS - Transportation     Lack of Transportation: No   Housing Stability: Not At Risk (6/24/2025)    NCSS - Housing/Utilities     Has Housing: Yes     Worried About Losing Housing: No     Unable to Get Utilities: No

## 2025-06-24 NOTE — BH PROGRESS NOTE
[facility] SOAP Note    Mattie Zheng Patient Status:  Inpatient    1985 MRN [id number]   [facility] 3SW-A Attending Zain Wylie MD   Hosp Day # 0 PCP Hiram Iniguez MD       S(subjective) This psych liaison met with Pt at bedside regarding PHQ-4 follow-up. Per patient she has a therapist she is currently seeing. Patient denied suicidal ideation \"I am so glad I am feeling better.\"  Patient has a therapist via private practice and medication management with a nurse practitioner. Per patient she identified support with family and friends. Patient expressed she is worried about having to have a certain procedure.     O(objective) MEL-7= 4, PHQ-4 = 8. Pt alert and oriented x  . Pt is not suicidal, homicidal, or responding to internal stimuli. Pt does not display a delusional thought process. Pt is dressed appropriately and is cooperative.    A(assessment) This psych liaison concludes the Pt is not a danger to herself or others.     P(plan) This psych liaison offered Pt resources for outpatient mental health therapy and Pt politely declined resources.      Griselda R., [facility]  2025  9:49 AM

## 2025-06-24 NOTE — ED INITIAL ASSESSMENT (HPI)
Patient complains of vomiting since last night at 2000. Patient seen at , given protonix and zofran with no relief.

## 2025-06-24 NOTE — ED QUICK NOTES
Orders for admission, patient is aware of plan and ready to go upstairs. Any questions, please call ED RN gokul at extension [phone].     Patient Covid vaccination status: Fully vaccinated     COVID Test Ordered in ED: None    COVID Suspicion at Admission: N/A    Running Infusions: Medication Infusions[1] potassium 20 meq w/NS on the side for comfort    Mental Status/LOC at time of transport: a/o x  3    Other pertinent information:   CIWA score: N/A   NIH score:  N/A             [1]

## 2025-06-25 ENCOUNTER — ANESTHESIA (OUTPATIENT)
Dept: ENDOSCOPY | Facility: HOSPITAL | Age: 40
End: 2025-06-25
Payer: COMMERCIAL

## 2025-06-25 VITALS
TEMPERATURE: 98 F | HEART RATE: 56 BPM | RESPIRATION RATE: 16 BRPM | HEIGHT: 63 IN | WEIGHT: 140 LBS | DIASTOLIC BLOOD PRESSURE: 98 MMHG | OXYGEN SATURATION: 98 % | SYSTOLIC BLOOD PRESSURE: 157 MMHG | BODY MASS INDEX: 24.8 KG/M2

## 2025-06-25 LAB
ANION GAP SERPL CALC-SCNC: 12 MMOL/L (ref 0–18)
BASOPHILS # BLD AUTO: 0.03 X10(3) UL (ref 0–0.2)
BASOPHILS # BLD AUTO: 0.03 X10(3) UL (ref 0–0.2)
BASOPHILS # BLD AUTO: 0.04 X10(3) UL (ref 0–0.2)
BASOPHILS NFR BLD AUTO: 0.3 %
BASOPHILS NFR BLD AUTO: 0.3 %
BASOPHILS NFR BLD AUTO: 0.4 %
BUN BLD-MCNC: <5 MG/DL (ref 9–23)
CALCIUM BLD-MCNC: 8.9 MG/DL (ref 8.7–10.6)
CHLORIDE SERPL-SCNC: 100 MMOL/L (ref 98–112)
CO2 SERPL-SCNC: 27 MMOL/L (ref 21–32)
CREAT BLD-MCNC: 0.75 MG/DL (ref 0.55–1.02)
EGFRCR SERPLBLD CKD-EPI 2021: 103 ML/MIN/1.73M2 (ref 60–?)
EOSINOPHIL # BLD AUTO: 0 X10(3) UL (ref 0–0.7)
EOSINOPHIL # BLD AUTO: 0.01 X10(3) UL (ref 0–0.7)
EOSINOPHIL # BLD AUTO: 0.01 X10(3) UL (ref 0–0.7)
EOSINOPHIL NFR BLD AUTO: 0 %
EOSINOPHIL NFR BLD AUTO: 0.1 %
EOSINOPHIL NFR BLD AUTO: 0.1 %
ERYTHROCYTE [DISTWIDTH] IN BLOOD BY AUTOMATED COUNT: 13.4 %
GLUCOSE BLD-MCNC: 95 MG/DL (ref 70–99)
HCT VFR BLD AUTO: 32.3 % (ref 35–48)
HCT VFR BLD AUTO: 33.4 % (ref 35–48)
HCT VFR BLD AUTO: 36.5 % (ref 35–48)
HGB BLD-MCNC: 11.6 G/DL (ref 12–16)
HGB BLD-MCNC: 11.8 G/DL (ref 12–16)
HGB BLD-MCNC: 12.6 G/DL (ref 12–16)
IMM GRANULOCYTES # BLD AUTO: 0.03 X10(3) UL (ref 0–1)
IMM GRANULOCYTES # BLD AUTO: 0.03 X10(3) UL (ref 0–1)
IMM GRANULOCYTES # BLD AUTO: 0.05 X10(3) UL (ref 0–1)
IMM GRANULOCYTES NFR BLD: 0.3 %
IMM GRANULOCYTES NFR BLD: 0.3 %
IMM GRANULOCYTES NFR BLD: 0.4 %
LYMPHOCYTES # BLD AUTO: 1.93 X10(3) UL (ref 1–4)
LYMPHOCYTES # BLD AUTO: 2.83 X10(3) UL (ref 1–4)
LYMPHOCYTES # BLD AUTO: 3.45 X10(3) UL (ref 1–4)
LYMPHOCYTES NFR BLD AUTO: 21.5 %
LYMPHOCYTES NFR BLD AUTO: 23.8 %
LYMPHOCYTES NFR BLD AUTO: 33.6 %
MAGNESIUM SERPL-MCNC: 1.8 MG/DL (ref 1.6–2.6)
MCH RBC QN AUTO: 29.2 PG (ref 26–34)
MCH RBC QN AUTO: 29.2 PG (ref 26–34)
MCH RBC QN AUTO: 29.7 PG (ref 26–34)
MCHC RBC AUTO-ENTMCNC: 34.5 G/DL (ref 31–37)
MCHC RBC AUTO-ENTMCNC: 35.3 G/DL (ref 31–37)
MCHC RBC AUTO-ENTMCNC: 35.9 G/DL (ref 31–37)
MCV RBC AUTO: 82.7 FL (ref 80–100)
MCV RBC AUTO: 82.8 FL (ref 80–100)
MCV RBC AUTO: 84.7 FL (ref 80–100)
MONOCYTES # BLD AUTO: 0.78 X10(3) UL (ref 0.1–1)
MONOCYTES # BLD AUTO: 1.04 X10(3) UL (ref 0.1–1)
MONOCYTES # BLD AUTO: 1.09 X10(3) UL (ref 0.1–1)
MONOCYTES NFR BLD AUTO: 10.1 %
MONOCYTES NFR BLD AUTO: 8.7 %
MONOCYTES NFR BLD AUTO: 9.2 %
NEUTROPHILS # BLD AUTO: 5.71 X10 (3) UL (ref 1.5–7.7)
NEUTROPHILS # BLD AUTO: 5.71 X10(3) UL (ref 1.5–7.7)
NEUTROPHILS # BLD AUTO: 6.2 X10 (3) UL (ref 1.5–7.7)
NEUTROPHILS # BLD AUTO: 6.2 X10(3) UL (ref 1.5–7.7)
NEUTROPHILS # BLD AUTO: 7.87 X10 (3) UL (ref 1.5–7.7)
NEUTROPHILS # BLD AUTO: 7.87 X10(3) UL (ref 1.5–7.7)
NEUTROPHILS NFR BLD AUTO: 55.5 %
NEUTROPHILS NFR BLD AUTO: 66.3 %
NEUTROPHILS NFR BLD AUTO: 69.1 %
PLATELET # BLD AUTO: 328 10(3)UL (ref 150–450)
PLATELET # BLD AUTO: 352 10(3)UL (ref 150–450)
PLATELET # BLD AUTO: 374 10(3)UL (ref 150–450)
POTASSIUM SERPL-SCNC: 3.1 MMOL/L (ref 3.5–5.1)
RBC # BLD AUTO: 3.9 X10(6)UL (ref 3.8–5.3)
RBC # BLD AUTO: 4.04 X10(6)UL (ref 3.8–5.3)
RBC # BLD AUTO: 4.31 X10(6)UL (ref 3.8–5.3)
SODIUM SERPL-SCNC: 139 MMOL/L (ref 136–145)
WBC # BLD AUTO: 10.3 X10(3) UL (ref 4–11)
WBC # BLD AUTO: 11.9 X10(3) UL (ref 4–11)
WBC # BLD AUTO: 9 X10(3) UL (ref 4–11)

## 2025-06-25 PROCEDURE — 99232 SBSQ HOSP IP/OBS MODERATE 35: CPT | Performed by: HOSPITALIST

## 2025-06-25 RX ORDER — MAGNESIUM OXIDE 400 MG/1
400 TABLET ORAL ONCE
Status: COMPLETED | OUTPATIENT
Start: 2025-06-25 | End: 2025-06-25

## 2025-06-25 RX ORDER — PANTOPRAZOLE SODIUM 40 MG/1
40 TABLET, DELAYED RELEASE ORAL
Qty: 120 TABLET | Refills: 0 | Status: SHIPPED | OUTPATIENT
Start: 2025-06-25 | End: 2025-08-24

## 2025-06-25 RX ORDER — CLONAZEPAM 1 MG/1
1 TABLET ORAL 3 TIMES DAILY PRN
Status: DISCONTINUED | OUTPATIENT
Start: 2025-06-25 | End: 2025-06-25

## 2025-06-25 RX ORDER — POTASSIUM CHLORIDE 1500 MG/1
40 TABLET, EXTENDED RELEASE ORAL ONCE
Status: COMPLETED | OUTPATIENT
Start: 2025-06-25 | End: 2025-06-25

## 2025-06-25 RX ORDER — LIDOCAINE HYDROCHLORIDE 10 MG/ML
INJECTION, SOLUTION EPIDURAL; INFILTRATION; INTRACAUDAL; PERINEURAL AS NEEDED
Status: DISCONTINUED | OUTPATIENT
Start: 2025-06-25 | End: 2025-06-25 | Stop reason: SURG

## 2025-06-25 RX ADMIN — LIDOCAINE HYDROCHLORIDE 25 MG: 10 INJECTION, SOLUTION EPIDURAL; INFILTRATION; INTRACAUDAL; PERINEURAL at 10:40:00

## 2025-06-25 RX ADMIN — SODIUM CHLORIDE, SODIUM LACTATE, POTASSIUM CHLORIDE, CALCIUM CHLORIDE: 600; 310; 30; 20 INJECTION, SOLUTION INTRAVENOUS at 10:37:00

## 2025-06-25 NOTE — OPERATIVE REPORT
EGD Operative Report  Patient Name: Mattie Zheng  YOB: 1985  MRN: [id number]  Procedure: Esophagogastroduodenoscopy (EGD)  with cold forceps biopsies  Pre-operative Diagnosis & Indication: abnormal esophagus imaging, nausea/vomiting  Post-operative Diagnosis:   1. LA grade D esophagitis  2. 3 cm hiatal hernia  Attending Endoscopist: Matthew Taveras MD  Informed Consent: The planned procedure(s), the explanation of the procedure, its expected benefits, the potential complications and risks and possible alternatives and their benefits and risks were discussed with the patient or the patient's surrogate. The discussion of risks, not limited to but including bleeding, infection, perforation, adverse effects from anesthesia, need for emergency surgery/prolonged hospitalization,  cardiac arrhythmias,  and aspiration were discussed with patient.  Pt and/or surrogate understood the proposed procedure(s), its risks, benefits and alternatives and wish to proceed with procedure(s). All questions answered in full.  After all questions were answered to their satisfaction, a signed, informed, and witnessed consent was obtained.  Physical Exam: Heart: regular rate and rhythm. No rubs, murmurs, or gallops. Lungs: Clear to auscultation bilaterally. Abdomen: Soft, non-tender, non distended. No rebound tenderness, no guarding.   A TIME OUT WAS COMPLETED prior to the procedure to confirm the patient, procedure(s) and complete endoscopy safety procedure.  Sedation: Monitored Anesthesia Care; ASA class per anesthesiology team   Monitoring: Pulsoximetry, pulse, respirations, and blood pressure , vitals were monitored throughout the entire procedure under monitored anesthesia care.   Procedure: The patient was then brought to the endoscopy suite where his/her pulse, pulse oximetry and blood pressure were monitored. The patient was placed in the left lateral decubitus position and deep sedation was administered. Once adequate  sedation was achieved, a bite block was placed and a lubricated tip of an Olympus video upper endoscope was inserted through the oropharynx and gently manipulated through the esophagus into the stomach and the second portion of the duodenum. Upon withdrawal of the endoscope, careful visualization of the mucosa was performed. The endoscope was then withdrawn into the gastric antrum and placed in a retroflexed position.  The endoscope was then righted, and air was suctioned from the stomach.  The endoscope was then withdrawn from the patient, with careful visual inspection of the mucosa. The patient left the procedure room in stable condition for recovery. Findings and endotherapy as listed below  Toleration: Patient tolerated procedure well   Complications: No immediate complications   Technical Difficulty:  The procedure was not technically difficult   Estimated Blood Loss: Minimal, less than 5mL of estimated blood loss.   Findings and Therapeutics:  Esophagus:   There was LA grade D esophagitis in the distal esophagus, otherwise the mucosa was normal.   There were no strictures or stenosis. GEJ junction traversed with endoscope without resistance.  The Z-line was irregular, appreciated at 28 cm from the incisors. The diaphragmatic impression was appreciated at 31 cm from the incisors. A 3 cm hiatal hernia was present.   Biopsies were obtained with cold forceps in the mid esophagus.   Stomach:    The gastric body, antrum, fundus, cardia, and angularis were normal. No ulcers, erosions or masses visualized. Endoscope was placed in a retroflexion view in the stomach. There was evidence of a hiatal hernia, Hill grade 3. Biopsies with cold forceps were obtained in the antrum and body.  Duodenum:   The entire examined duodenum was normal.  Biopsies with cold forceps were obtained in the bulb and 2nd portion.   Recommendations:  Post EGD precautions, watch for bleeding, infection, perforation, adverse drug reactions    Follow-up biopsies  Pantoprazole 40mg po bid x8 weeks  Avoid non-aspirin NSAID  Repeat EGD in 12 weeks   Avoid marijuana use  Ok for regular diet  If tolerating po ok to DC today from GI perspective we will arrange outpatient f/u  GI will sign off now re-consult with questions/concerns    Matthew Taveras MD  6/25/2025  10:52 AM

## 2025-06-25 NOTE — DISCHARGE SUMMARY
[facility]  DISCHARGE SUMMARY     Mattie Zheng Patient Status:  Inpatient    1985 MRN [id number]   Location [facility] 3SW-A Attending Macario Green MD   Hosp Day # 1 PCP Hiram Iniguez MD     Date of Admission: 2025  Date of Discharge: 2025  Discharge Disposition: Home or Self Care  Chief complaint:   Chief Complaint   Patient presents with    Nausea/Vomiting/Diarrhea         Discharge Diagnoses and Brief Synopsis:  #Coffee ground emesis; underwent EGD and showed the followin. LA grade D esophagitis  2. 3 cm hiatal hernia    PPI for next 8 weeks; see below; plan repeat EGD 12 weeks; tolerating diet at time of discharge; no complications.  Avoid marijuan usea     #hx esophagitis     #hypokalemia     #elevated serum creatinine; resolved back to normal with fluids     #anxiety     #insomnia        Lab/Test results pending at Discharge:   none        Admission History of Present Illness (author of HPI not necessarily myself; see H&P author): Mattie Zheng is a 40 year old female with PMHx esophagitis/ hypothyroidism who presented to the hospital for emesis. She has been very stressed and anxious and feels like one of her symptoms of her stress is emesis. She has Zofran at home but once she starts vomiting she cannot keep anything down or get it to stop. She was having burning in her midline chest as well. She was hospitalized in January for similar symptoms and was found to have grade D esophagitis. She just stopped her PPI 2-3 weeks ago. Her vomit was initially yellow but looked more like coffee grounds with her most recent episode. She denied any abdominal pain, diarrhea, bloody or black stools.       Lace+ Score: 49  59-90 High Risk  29-58 Medium Risk  0-28   Low Risk  Patient was referred to the [facility].    TCM Follow-Up Recommendation:  LACE 29-58: Moderate Risk of readmission after discharge from the hospital.      Discharge Medication List:      Discharge Medications        CHANGE how you take these medications        Instructions Prescription details   clonazePAM 1 MG Tabs  Commonly known as: KlonoPIN  What changed: Another medication with the same name was removed. Continue taking this medication, and follow the directions you see here.      Take 1 tablet (1 mg total) by mouth 3 (three) times daily as needed for Anxiety.   Quantity: 90 tablet  Refills: 0            CONTINUE taking these medications        Instructions Prescription details   Iliana 1/20 1-20 MG-MCG Tabs  Generic drug: Norethindrone Acet-Ethinyl Est      Take 1 tablet by mouth daily.   Refills: 0     pantoprazole 40 MG Tbec  Commonly known as: Protonix      Take 1 tablet (40 mg total) by mouth 2 (two) times daily before meals.   Stop taking on: August 24, 2025  Quantity: 120 tablet  Refills: 0     QUEtiapine 100 MG Tabs  Commonly known as: SEROquel      Take 1.5 tablets (150 mg total) by mouth nightly.   Stop taking on: September 2, 2025  Quantity: 135 tablet  Refills: 0     zolpidem 10 MG Tabs  Commonly known as: Ambien      Take 1 tablet (10 mg total) by mouth nightly as needed for Sleep.   Quantity: 30 tablet  Refills: 0            STOP taking these medications      levothyroxine 25 MCG Tabs  Commonly known as: Synthroid        metoclopramide 10 MG Tabs  Commonly known as: Reglan                  Where to Get Your Medications        Please  your prescriptions at the location directed by your doctor or nurse    Bring a paper prescription for each of these medications  pantoprazole 40 MG Tbec         [place]    Follow-up appointment:   Matthew Taveras MD  [address] [facility] [address] [phone]    Go in 3 week(s)  for a follow-up office visit with GI., The office will call to arrange date/time of visit    Hiram Iniguez MD  [address] [address]  [facility] [address] [phone]    Schedule an appointment as soon as possible for a visit in 2  week(s)      Appointments for Next 30 Days 6/26/2025 - 7/26/2025        Date Arrival Time Visit Type Length Department Provider     6/27/2025 12:30 PM  NON-TCM HOSPITAL FOLLOW UP [5060] [facility] Yoselin Beckman APRN    Patient Instructions:         Location Instructions:     Masks are optional for all patients and visitors, unless otherwise indicated. Note: A $50 fee will be charged for missed appointments or same-day cancellations. Please provide 24 hours' notice if you need to cancel or reschedule your appointment.                      Vital signs:  Temp:  [98.4 °F (36.9 °C)] 98.4 °F (36.9 °C)  Pulse:  [] 56  Resp:  [15-16] 16  BP: (143-170)/() 157/98  SpO2:  [95 %-98 %] 98 %    Physical Exam:    General: No acute distress  Respiratory: no wheezes, no rhonchi  Cardiovascular: S1, S2, RRR  Abdomen: Soft, NT/ND, +BS  Neuro: No new focal deficits.   Extremities: no edema  -----------------------------------------------------------------------------------------------  PATIENT DISCHARGE INSTRUCTIONS: See electronic chart    Macario Baer MD    Time spent:   31 minutes

## 2025-06-25 NOTE — PLAN OF CARE
Patient A&O X4 on RA. VSS, /IS. SCDs encouraged. Voiding without difficulty via bathroom, LBM 06/23. IVF infusing per orders. Admitted with n/v. Clear liquid diet- tolerating okay, minimal nausea but subsided. Up standby assist. Reminded to use call light. Plan for EGD tomorrow.

## 2025-06-25 NOTE — ANESTHESIA POSTPROCEDURE EVALUATION
[facility]    Mattie Zheng Patient Status:  Inpatient   Age/Gender 40 year old female MRN [id number]   Location [facility] Attending Macario Green MD   Hosp Day # 1 PCP Hiram Iniguez MD       Anesthesia Post-op Note    ESOPHAGOGASTRODUODENOSCOPY (EGD) with biopsies    Procedure Summary       Date: 06/25/25 Room / Location:  ENDOSCOPY 04 /  ENDOSCOPY    Anesthesia Start: 1036 Anesthesia Stop: 1059    Procedure: ESOPHAGOGASTRODUODENOSCOPY (EGD) with biopsies Diagnosis: (esophagitis, hiatal hernia)    Surgeons: Matthew Taveras MD Anesthesiologist: Gagan Gilliam MD    Anesthesia Type: MAC ASA Status: Not recorded            Anesthesia Type: MAC    Vitals Value Taken Time   /105 06/25/25 11:00   Temp  06/25/25 11:00   Pulse 77 06/25/25 11:00   Resp 15 06/25/25 10:59   SpO2 97 % 06/25/25 11:00   Vitals shown include unfiled device data.        Patient Location: Endoscopy    Anesthesia Type: MAC    Airway Patency: patent    Postop Pain Control: adequate    Mental Status: mildly sedated but able to meaningfully participate in the post-anesthesia evaluation    Nausea/Vomiting: none    Cardiopulmonary/Hydration status: stable euvolemic    Complications: no apparent anesthesia related complications    Postop vital signs: stable    Dental Exam: Unchanged from Preop    Patient to be discharged from PACU when criteria met.

## 2025-06-25 NOTE — PLAN OF CARE
NURSING DISCHARGE NOTE    Discharged Home via Wheelchair.  Accompanied by Support staff  Belongings Taken by patient/family.    Pt tolerating diet. Denies N/V. IV removed. Discharge teaching completed. All questions answered.

## 2025-06-25 NOTE — PAYOR COMM NOTE
--------------  ADMISSION REVIEW     Payor: [facility]  Subscriber #:  [id number]  Authorization Number: [id number]    Admit date: 6/24/25  Admit time:  4:17 AM       REVIEW DOCUMENTATION:  ED Provider Notes signed by Harleen Miles DO at 6/24/2025  6:55 AM       Author: Harleen Miles DO Service: -- Author Type: Physician    Filed: 6/24/2025  6:55 AM Date of Service: 6/24/2025 12:17 AM Status: Signed     Patient Seen in: [facility] Emergency Department    History  Chief Complaint   Patient presents with    Nausea/Vomiting/Diarrhea     Stated Complaint: n/v, went to  this AM and was prescribed zofran without relief    HPI  Patient is a 40-year-old female presenting to the ED with nausea and vomiting, numerous episodes, in the last 24 hours.  The history is obtained from patient who is a good historian.  The patient reports that she had an EGD in January 2025 and was diagnosed with esophagitis and gastritis.  She was started on Protonix daily but discontinued it 2 or 3 weeks ago.  The patient reports that the symptoms returned and are similar to what she is experienced in the past with quite severe, pain described as a burning sensation within the chest and upper abdomen, associate with multiple episodes of nausea and vomiting that has now turned brown in color.  No gross hematemesis.  No recent black stool.  She did try taking Pepto-Bismol earlier today.  She is already seen at immediate care as well and was prescribed Protonix and Zofran.  She has continued to have multiple episodes of vomiting despite trying Zofran ODT at home.  Patient also states she feels anxious.  The patient denies any excessive alcohol or NSAID use.    Physical Exam  ED Triage Vitals   BP 06/23/25 2054 (!) 147/108   Pulse 06/23/25 2054 120   Resp 06/23/25 2054 20   Temp 06/23/25 2054 97.9 °F (36.6 °C)   Temp src 06/23/25 2054 Oral   SpO2 06/23/25 2300 100 %   O2 Device 06/23/25 2300 None (Room air)     Vital Signs  BP:  130/90  Pulse: 93  Resp: 14  Temp: 97.9 °F (36.6 °C)  Temp src: Oral  MAP (mmHg): (!) 102    Oxygen Therapy  SpO2: 99 %  O2 Device: None (Room air)    Physical Exam  Vitals and nursing note reviewed.   Constitutional:       General: She is not in acute distress.     Appearance: Normal appearance. She is well-developed. She is not ill-appearing or toxic-appearing.      Comments: Coffee-ground emesis noted in basin   HENT:      Head: Normocephalic and atraumatic.      Right Ear: External ear normal.      Left Ear: External ear normal.      Mouth/Throat:      Mouth: Mucous membranes are moist.      Pharynx: Oropharynx is clear.   Eyes:      Conjunctiva/sclera: Conjunctivae normal.   Cardiovascular:      Rate and Rhythm: Normal rate and regular rhythm.      Heart sounds: Normal heart sounds.   Pulmonary:      Effort: Pulmonary effort is normal.      Breath sounds: Normal breath sounds.   Abdominal:      General: Abdomen is flat. Bowel sounds are normal. There is no distension.      Palpations: Abdomen is soft.      Tenderness: There is abdominal tenderness. There is no guarding or rebound.      Comments: Epigastric tenderness   Musculoskeletal:      Right lower leg: No edema.      Left lower leg: No edema.   Skin:     General: Skin is warm.      Capillary Refill: Capillary refill takes less than 2 seconds.      Findings: No rash.   Neurological:      General: No focal deficit present.      Mental Status: She is alert and oriented to person, place, and time.   Psychiatric:         Behavior: Behavior normal.      Comments: Anxious     ED Course  Labs Reviewed   COMP METABOLIC PANEL (14) - Abnormal; Notable for the following components:       Result Value    Glucose 119 (*)     Potassium 3.1 (*)     Chloride 91 (*)     Anion Gap 21 (*)     BUN 8 (*)     Creatinine 1.15 (*)     Calcium, Total 10.7 (*)     Alkaline Phosphatase 103 (*)     Total Protein 8.7 (*)     Albumin 5.7 (*)     All other components within normal limits    CBC WITH DIFFERENTIAL WITH PLATELET - Abnormal; Notable for the following components:    WBC 17.1 (*)     .0 (*)     Neutrophil Absolute Prelim 14.41 (*)     Neutrophil Absolute 14.41 (*)     Monocyte Absolute 1.03 (*)     All other components within normal limits   URINALYSIS WITH CULTURE REFLEX - Abnormal; Notable for the following components:    Glucose Urine 100 (*)     Ketones Urine >150 (*)     Blood Urine Trace (*)     Protein Urine 300 (*)     Urobilinogen Urine 2 (*)     Bacteria Urine Rare (*)     Squamous Epi. Cells Few (*)     Hyaline Casts Present (*)     All other components within normal limits   CBC WITH DIFFERENTIAL WITH PLATELET - Abnormal; Notable for the following components:    WBC 17.4 (*)     HGB 11.8 (*)     HCT 32.9 (*)     Neutrophil Absolute Prelim 13.18 (*)     Neutrophil Absolute 13.18 (*)     Monocyte Absolute 1.56 (*)     All other components within normal limits   LIPASE - Normal   MAGNESIUM - Normal   PROTHROMBIN TIME (PT) - Normal   PTT, ACTIVATED - Normal   ICTOTEST - Normal   POCT PREGNANCY URINE - Normal   CBC WITH DIFFERENTIAL WITH PLATELET   CBC WITH DIFFERENTIAL WITH PLATELET     MDM   History obtained from patient.   Differential diagnosis includes gastritis, esophagitis, peptic ulcer disease, viscus perforation, upper GI bleeding, Tamiko-Trevizo tear.    Previous records reviewed.  Patient hospitalized in May 2025.  Was seen by GI at that time.  Patient had an EGD which demonstrated grade D esophagitis with biopsies taken at that time.  Patient was placed on PPI twice daily indefinitely with plan for repeat EGD in 8 to 12 weeks.  It does not appear that the patient followed up for repeat EGD.    Testing considered and ordered includes CBC, CMP, lipase, coagulation studies, UCG, UA, CT scan of the chest abdomen and pelvis given the patient is complaining of severe pain.  Magnesium added due to low potassium levels.    I reviewed all results.  CMP reviewed with a  glucose of 119, hypokalemia with potassium of 3.1.  Calcium at 10.7.  Creatinine of 1.15.  Bicarb normal at 24 however patient does have increased anion gap.  CBC reviewed with WBC of 17.1 and platelet count at 458.  UA reviewed with significant ketones present in the urine which could be contributing to acidosis.  Lipase normal.  UCG negative.  Coagulation studies normal.  Magnesium is normal.    I also reviewed the official report which shows   CTA chest with contrast  CT abdomen and pelvis with contrast    IMPRESSION:  CT CHEST:  Adequate contrast bolus timing without evidence of pulmonary embolism.    No evidence of acute aortic injury on this nongated exam.  No focal lung consolidation.  No suspicious pulmonary nodule.  No pneumothorax or pleural effusion.  No acute bony abnormality.    CT ABDOMEN AND PELVIS:  No acute abnormality in the abdomen or pelvis.  No appendicitis.  No diverticulitis.  No acute biliary pathology.  No obstructive urolithiasis.  No SBO.    Hepatic steatosis.  Others who assisted in [place]'s care [place] with plan for admission, further GI evaluation, IV hydration.    Interventions in care included patient was attempting to use Zofran at home without relief in symptoms.  She did respond to Reglan and Benadryl.  She was also given IV PPI, Protonix 40 mg IV.  Potassium was replaced IV, 20 mill equivalents.  Ativan was also ordered for patient's anxiety.  She received IV fluids.  Given that she was already seen in immediate care this morning and had subsequent emesis with significant abdominal pain and epigastric pain through the esophagus with failed outpatient treatment as well as coffee-ground emesis and significant history of esophagitis without any follow-up EGD will plan for hospitalization.  Plan discussed with patient.    Admission disposition: 6/24/2025  2:57 AM  Medical Decision Making  Disposition and Plan     Clinical Impression:  1. Intractable nausea and  vomiting    2. Coffee ground emesis    3. Abdominal pain, acute    4. History of esophagitis    5. Hypokalemia       Disposition:  Admit  6/24/2025  2:57 am    Harleen Miles, DO on 6/24/2025  6:55 AM      History and Physical   Chief Complaint: vomiting    History of Present Illness:   40 year old female with PMHx esophagitis/ hypothyroidism who presented to the hospital for emesis. She has been very stressed and anxious and feels like one of her symptoms of her stress is emesis. She has Zofran at home but once she starts vomiting she cannot keep anything down or get it to stop. She was having burning in her midline chest as well. She was hospitalized in January for similar symptoms and was found to have grade D esophagitis. She just stopped her PPI 2-3 weeks ago. Her vomit was initially yellow but looked more like coffee grounds with her most recent episode. She denied any abdominal pain, diarrhea, bloody or black stools.      Lab 06/23/25  2126   RBC 5.09   HGB 14.9   HCT 41.4   MCV 81.3   MCH 29.3   MCHC 36.0   RDW 13.1   NEPRELIM 14.41*   WBC 17.1*   .0*     *   BUN 8*   CREATSERUM 1.15*   EGFRCR 61   CA 10.7*   ALB 5.7*      K 3.1*   CL 91*   CO2 24.0   ALKPHO 103*   AST 24   ALT 24   BILT 1.1   TP 8.7*       INR 1.02 06/24/2025     INR 1.01 01/30/2025       Assessment & Plan:  #Coffee ground emesis  #intractable n/v  #leukocytosis  #hx esophagitis  -hgb 14.9, WBC 17.1  -hx endoscopy Jan 2025 showing grade D esophagitis  -suspect 2/2 recurrent esophagitis after PPI discontinued, may have Tamiko Emily tear as well given repeated emesis  -liquid diet  -CBC q6H  -protonix 40 IV BID  -zofran prn  -LR @100 mls/h until PO intake improves  -GI c/s     #hypokalemia  -K 3.1  -replete  -cont to monitor BMP     #elevated serum creatinine  -Cr 1.14: baseline 1.01 with GFR 72  -less than 1.5x elevation likely 2/2 volume depletion  -IVF  above  -cont to monitor BMP  -strict I/O, avoid  nephrotoxins    #insomnia  -cont home zolpidem      Gastroenterology Consultation   Reason for consultation: Coffee-ground emesis, abnormal esophageal imaging  HPI: This is a 40 yr-old female presented to ER overnight with persistent nausea + vomiting which progressed to coffee-ground emesis.    Patient reports recovering from her epigastric pain, persistent N/V, and hematemesis which required hospitalization in January and completed her course of PPI until ~3 weeks ago.   6/22  she developed severe, constant epigastric burning which progressed to multiple episodes of vomiting which turned into coffee-ground emesis.  She denies diarrhea, melena, or hematochezia.  Patient reports using a dose of Pepto-Bismol without relief.  Chronically, patient uses NSAIDs--4 tablets of Aleve several times daily to help manage menstrual cramping which she develops every 3-4 weeks.  No EGD since 1/2025 as patient has been lost to follow-up and no history of undergoing a colonoscopy.  CT C/A/P IV suggests hepatic steatosis, fluid density within the esophagus and some mild esophageal wall thickening at GEJ, and stable lung nodule; labs persistent leukocytosis (WBC 17.4 from 12-14 when admitted in January 2025), Hgb 11.8 from 14.9 on arrival with prior baseline 13-14, normal plt (341), normal INR (1.02), normal lipase (50), STANLEY (creat 1.15 with BUN normal 8), hypokalemia (3.1), and normal LFTs with exception of a mild alk phos elevation (103).   Since admission, patient reports improved pain and nausea without any further episodes of overt GI bleeding.  Lab 06/24/25  0553   RBC 4.00   HGB 11.8*   HCT 32.9*   MCV 82.3   MCH 29.5   MCHC 35.9   RDW 13.3   NEPRELIM 13.18*   WBC 17.4*   .0     Impression/Recommendations :   history of LA grade D esophagitis who presents with acute nausea and vomiting, with CT showing esophageal thickening; she also had coffee ground emesis. On exam, her nausea/vomiting have resolved and she feels  well. Recommend IV PPI and EGD on 6/25 for further evaluation.       6/25/2025  Operative Report     Procedure: Esophagogastroduodenoscopy (EGD)  with cold forceps biopsies  Pre-operative Diagnosis & Indication: abnormal esophagus imaging, nausea/vomiting  Post-operative Diagnosis:   1. LA grade D esophagitis  2. 3 cm hiatal hernia    Findings and Therapeutics:  Esophagus:   There was LA grade D esophagitis in the distal esophagus, otherwise the mucosa was normal.   There were no strictures or stenosis. GEJ junction traversed with endoscope without resistance.  The Z-line was irregular, appreciated at 28 cm from the incisors. The diaphragmatic impression was appreciated at 31 cm from the incisors. A 3 cm hiatal hernia was present.   Biopsies were obtained with cold forceps in the mid esophagus.   Stomach:    The gastric body, antrum, fundus, cardia, and angularis were normal. No ulcers, erosions or masses visualized. Endoscope was placed in a retroflexion view in the stomach. There was evidence of a hiatal hernia, Hill grade 3. Biopsies with cold forceps were obtained in the antrum and body.  Duodenum:   The entire examined duodenum was normal.  Biopsies with cold forceps were obtained in the bulb and 2nd portion.   Recommendations:  Post EGD precautions, watch for bleeding, infection, perforation, adverse drug reactions   Follow-up biopsies  Pantoprazole 40mg po bid x8 weeks  Avoid non-aspirin NSAID  Repeat EGD in 12 weeks   Avoid marijuana use  Ok for regular diet  GI will sign off now    MEDICATIONS ADMINISTERED:  lactated ringers infusion       Date Action Dose Route User    6/25/2025 1037 Restarted (none) Intravenous Gagan Gilliam MD    6/25/2025 1036 Continued by Anesthesia (none) Intravenous Gagan Gilliam MD    6/25/2025 0531 New Bag (none) Intravenous Mattie Sofia, RN    6/24/2025 1729 New Bag (none) Intravenous Kulawnt Navas, KELLIE          lidocaine PF (Xylocaine-MPF) 1% injection       Date Action Dose  Route User    6/25/2025 1040 Given 25 mg Intravenous Gagan Gilliam MD          LORazepam (Ativan) 2 mg/mL injection 1 mg       Date Action Dose Route User    6/25/2025 0531 Given 1 mg Intravenous Mattie Sofia RN    6/24/2025 2321 Given 1 mg Intravenous Mattie Sofia RN          magnesium oxide (Mag-Ox) tab 400 mg       Date Action Dose Route User    6/25/2025 0817 Given 400 mg Oral Iwona Salazar RN          ondansetron (Zofran) 4 MG/2ML injection 4 mg       Date Action Dose Route User    6/24/2025 1614 Given 4 mg Intravenous Kulwant Navas RN          pantoprazole (Protonix) 40 mg in sodium chloride 0.9% PF 10 mL IV push       Date Action Dose Route User    6/25/2025 1157 Given 40 mg Intravenous Iwona Salazar RN    6/24/2025 2321 Given 40 mg Intravenous Mattie Sofia RN    6/24/2025 1352 Given 40 mg Intravenous Kulwant Navas RN          potassium chloride (Klor-Con M20) tab 40 mEq       Date Action Dose Route User    6/25/2025 0817 Given 40 mEq Oral Iwona Salazar RN          prochlorperazine (Compazine) 10 MG/2ML injection 5 mg       Date Action Dose Route User    6/24/2025 1729 Given 5 mg Intravenous Kulwant Navas RN          propofol (Diprivan) 10 MG/ML injection       Date Action Dose Route User    6/25/2025 1041 Given 40 mg Intravenous Gagan Gilliam MD    6/25/2025 1040 Given 60 mg Intravenous Gagan Gilliam MD          propofol (Diprivan) 10 mg/mL infusion premix       Date Action Dose Route User    6/25/2025 1040 New Bag 200 mcg/kg/min × 63.5 kg Intravenous Gagan Gilliam MD          zolpidem (Ambien) tab 10 mg       Date Action Dose Route User    6/25/2025 0036 Given 10 mg Oral Mattie Sofia RN     Vitals      Date/Time Temp Pulse Resp BP SpO2 Weight O2 Device O2 Flow Rate (L/min) [facility]    06/25/25 1158 98.4 °F (36.9 °C) 56 16 157/98 98 % -- None (Room air) 0 L/min TC    06/25/25 1120 -- 102 -- 164/107 96 % -- -- -- JENNIFER    06/25/25 1115 -- 86 -- 170/95 97 % -- -- --     06/25/25 1110  -- 80 -- 168/106 98 % -- -- -- JENNIFER    06/25/25 1105 -- 74 -- 163/106 98 % -- -- -- JENNIFER    06/25/25 1102 -- 83 16 143/96 95 % -- None (Room air) -- JENNIFER    06/25/25 1059 -- 81 15 -- 97 % -- -- -- ML    06/25/25 0421 99 °F (37.2 °C) 94 18 -- -- -- None (Room air) 0 L/min AH    06/25/25 0016 99.5 °F (37.5 °C) 73 18 150/86 98 % -- None (Room air) 0 L/min KE    06/24/25 1905 98.7 °F (37.1 °C) 86 18 139/82 99 % -- None (Room air) 0 L/min     06/24/25 1221 98 °F (36.7 °C) 88 16 134/79 98 % -- None (Room air) 0 L/min     06/24/25 0433 -- -- -- -- -- 140 lb (63.5 kg) -- -- KA    06/24/25 0345 -- 93 14 130/90 99 % -- None (Room air) -- [facility]    06/24/25 0330 -- 93 13 -- 99 % -- None (Room air) -- [facility]    06/24/25 0200 -- 80 18 117/78 97 % -- None (Room air) -- [facility]    06/24/25 0115 -- 81 12 122/107 100 % -- -- -- [facility]    06/24/25 0030 -- 79 11 154/97 99 % -- -- -- [facility]     06/23/25 2300 -- 95 -- 153/118 Abnormal  100 % -- None (Room air) -- [facility]   06/23/25 2054 97.9 °F (36.6 °C) 120 20 147/108 Abnormal  -- 140 lb (63.5 kg) -- -- DV       FOR REVIEW/APPROVAL OF INPT ADMISSION

## 2025-06-25 NOTE — PLAN OF CARE
Aox4. VSS. On RA. . Voiding without difficulty. Denies nausea/vomiting at this time. Up ad cirilo. IVF as ordered. Plan NPO for EGD today at 1100AM.

## 2025-06-26 ENCOUNTER — PATIENT OUTREACH (OUTPATIENT)
Dept: CASE MANAGEMENT | Age: 40
End: 2025-06-26

## 2025-06-30 ENCOUNTER — OFFICE VISIT (OUTPATIENT)
Dept: INTERNAL MEDICINE CLINIC | Facility: CLINIC | Age: 40
End: 2025-06-30
Payer: COMMERCIAL

## 2025-06-30 VITALS
HEIGHT: 65 IN | DIASTOLIC BLOOD PRESSURE: 70 MMHG | OXYGEN SATURATION: 99 % | HEART RATE: 68 BPM | TEMPERATURE: 98 F | BODY MASS INDEX: 24.49 KG/M2 | RESPIRATION RATE: 16 BRPM | SYSTOLIC BLOOD PRESSURE: 118 MMHG | WEIGHT: 147 LBS

## 2025-06-30 DIAGNOSIS — R11.2 INTRACTABLE NAUSEA AND VOMITING: ICD-10-CM

## 2025-06-30 DIAGNOSIS — K44.9 HIATAL HERNIA: ICD-10-CM

## 2025-06-30 DIAGNOSIS — N17.9 AKI (ACUTE KIDNEY INJURY): ICD-10-CM

## 2025-06-30 DIAGNOSIS — R91.1 LUNG NODULE: ICD-10-CM

## 2025-06-30 DIAGNOSIS — E87.6 HYPOKALEMIA: ICD-10-CM

## 2025-06-30 DIAGNOSIS — E83.52 HYPERCALCEMIA: ICD-10-CM

## 2025-06-30 DIAGNOSIS — K20.80 LOS ANGELES GRADE D ESOPHAGITIS: ICD-10-CM

## 2025-06-30 DIAGNOSIS — K92.0 COFFEE GROUND EMESIS: Primary | ICD-10-CM

## 2025-06-30 PROCEDURE — 3008F BODY MASS INDEX DOCD: CPT | Performed by: NURSE PRACTITIONER

## 2025-06-30 PROCEDURE — 3078F DIAST BP <80 MM HG: CPT | Performed by: NURSE PRACTITIONER

## 2025-06-30 PROCEDURE — 3074F SYST BP LT 130 MM HG: CPT | Performed by: NURSE PRACTITIONER

## 2025-06-30 PROCEDURE — 99495 TRANSJ CARE MGMT MOD F2F 14D: CPT | Performed by: NURSE PRACTITIONER

## 2025-06-30 NOTE — PROGRESS NOTES
[facility] PHARMACIST MEDICATION RECONCILIATION        Mattie Zheng MRN [id number]    1985 PCP Hiram Iniguez MD       Comments: Medication history completed by the [facility] Pharmacist with the patient in the office.     The following medication changes occurred while patient was hospitalized:  Medications Stopped:   Levothyroxine 25mcg tabs   Metoclopramide 10mg tabs    Medications Changed:   Clonazepam 1mg tabs - 1 tablet three times daily as needed for anxiety    Outpatient Encounter Medications as of 2025   Medication Sig    pantoprazole 40 MG Oral Tab EC Take 1 tablet (40 mg total) by mouth 2 (two) times daily before meals.    zolpidem 10 MG Oral Tab Take 1 tablet (10 mg total) by mouth nightly as needed for Sleep.    clonazePAM 1 MG Oral Tab Take 1 tablet (1 mg total) by mouth 3 (three) times daily as needed for Anxiety.    QUEtiapine (SEROQUEL) 100 MG Oral Tab Take 1.5 tablets (150 mg total) by mouth nightly.    Norethindrone Acet-Ethinyl Est (URI ) 1-20 MG-MCG Oral Tab Take 1 tablet by mouth daily. (Patient not taking: Reported on 2025)     Medication Adherence Assessment:   Patient reports taking the Pantoprazole twice daily as prescribed.  Counseled the patient to take at least 30 minutes prior to food.  Patient understands that she needs to continue the twice daily for the next 2 months, and then will need to follow GI recommendations after that.  Patient states she understands.    Patient reports taking 1 Naproxen after discharge to help with all the pain in her muscles.  States she took the Naproxen with a full glass of water.  Recommended limiting the amount of NSAID's taken to help the esophagitis heal, and discussed the importance of taking the medication with food if taken.  Recommended taking Acetaminophen, but patient states it does not work for her.  Taking all other medications as prescribed except for the birth control, she has stopped taking  at this time.    Reviewed all medications in detail with patient including dose, indication, timing of administration, monitoring parameters, and potential side effects of medications.   Patient confirmed understanding.     Thank you,    Blanca Cano, PharmD, 6/30/2025, 2:11 PM  [facility]

## 2025-07-01 NOTE — PAYOR COMM NOTE
--------------  DISCHARGE REVIEW    Payor: [facility]  Subscriber #:  [id number]  Authorization Number: [id number]    Admit date: 25  Admit time:   4:17 AM  Discharge Date: 2025  2:38 PM     Admitting Physician: Xi Guerrero DO  Attending Physician:  No att. providers found  Primary Care Physician: Hiram Iniguez MD          Discharge Summary Notes        Discharge Summary signed by Macario Green MD at 2025  4:24 AM       Author: Macario Green MD Specialty: HOSPITALIST, Internal Medicine Author Type: Physician    Filed: 2025  4:24 AM Date of Service: 2025 11:56 AM Status: Signed    : Macario Green MD (Physician)           [facility]  DISCHARGE SUMMARY     Mattie Zheng Patient Status:  Inpatient    1985 MRN [id number]   Location [facility] 3SW-A Attending Macario Green MD   Hosp Day # 1 PCP Hiram Iniguez MD     Date of Admission: 2025  Date of Discharge: 2025  Discharge Disposition: Home or Self Care  Chief complaint:   Chief Complaint   Patient presents with    Nausea/Vomiting/Diarrhea         Discharge Diagnoses and Brief Synopsis:  #Coffee ground emesis; underwent EGD and showed the followin. LA grade D esophagitis  2. 3 cm hiatal hernia    PPI for next 8 weeks; see below; plan repeat EGD 12 weeks; tolerating diet at time of discharge; no complications.  Avoid marijuan usea     #hx esophagitis     #hypokalemia     #elevated serum creatinine; resolved back to normal with fluids     #anxiety     #insomnia        Lab/Test results pending at Discharge:   none        Admission History of Present Illness (author of HPI not necessarily myself; see H&P author): Mattie Zheng is a 40 year old female with PMHx esophagitis/ hypothyroidism who presented to the hospital for emesis. She has been very stressed and anxious and feels like one of her symptoms of her stress is emesis. She has Zofran at home but once she starts vomiting she cannot keep  anything down or get it to stop. She was having burning in her midline chest as well. She was hospitalized in January for similar symptoms and was found to have grade D esophagitis. She just stopped her PPI 2-3 weeks ago. Her vomit was initially yellow but looked more like coffee grounds with her most recent episode. She denied any abdominal pain, diarrhea, bloody or black stools.       Lace+ Score: 49  59-90 High Risk  29-58 Medium Risk  0-28   Low Risk  Patient was referred to the [facility].    TCM Follow-Up Recommendation:  LACE 29-58: Moderate Risk of readmission after discharge from the hospital.      Discharge Medication List:     Discharge Medications        CHANGE how you take these medications        Instructions Prescription details   clonazePAM 1 MG Tabs  Commonly known as: KlonoPIN  What changed: Another medication with the same name was removed. Continue taking this medication, and follow the directions you see here.      Take 1 tablet (1 mg total) by mouth 3 (three) times daily as needed for Anxiety.   Quantity: 90 tablet  Refills: 0            CONTINUE taking these medications        Instructions Prescription details   Iliana 1/20 1-20 MG-MCG Tabs  Generic drug: Norethindrone Acet-Ethinyl Est      Take 1 tablet by mouth daily.   Refills: 0     pantoprazole 40 MG Tbec  Commonly known as: Protonix      Take 1 tablet (40 mg total) by mouth 2 (two) times daily before meals.   Stop taking on: August 24, 2025  Quantity: 120 tablet  Refills: 0     QUEtiapine 100 MG Tabs  Commonly known as: SEROquel      Take 1.5 tablets (150 mg total) by mouth nightly.   Stop taking on: September 2, 2025  Quantity: 135 tablet  Refills: 0     zolpidem 10 MG Tabs  Commonly known as: Ambien      Take 1 tablet (10 mg total) by mouth nightly as needed for Sleep.   Quantity: 30 tablet  Refills: 0            STOP taking these medications      levothyroxine 25 MCG Tabs  Commonly known as: Synthroid         metoclopramide 10 MG Tabs  Commonly known as: Reglan                  Where to Get Your Medications        Please  your prescriptions at the location directed by your doctor or nurse    Bring a paper prescription for each of these medications  pantoprazole 40 MG [facility] reviewed: yes    Follow-up appointment:   Matthew Taveras MD  [address] [place] [address]  [phone]    Go in 3 week(s)  for a follow-up office visit with GI., The office will call to arrange date/time of visit    Hiram Iniguez MD  [address] [address]  [facility] [address]  [phone]    Schedule an appointment as soon as possible for a visit in 2 week(s)      Appointments for Next 30 Days 6/26/2025 - 7/26/2025        Date Arrival Time Visit Type Length Department Provider     6/27/2025 12:30 PM  NON-TCM HOSPITAL FOLLOW UP [5060] 60 min [facility] Yoselin Beckman APRN    Patient Instructions:         Location Instructions:     Masks are optional for all patients and visitors, unless otherwise indicated. Note: A $50 fee will be charged for missed appointments or same-day cancellations. Please provide 24 hours' notice if you need to cancel or reschedule your appointment.                      Vital signs:  Temp:  [98.4 °F (36.9 °C)] 98.4 °F (36.9 °C)  Pulse:  [] 56  Resp:  [15-16] 16  BP: (143-170)/() 157/98  SpO2:  [95 %-98 %] 98 %    Physical Exam:    General: No acute distress  Respiratory: no wheezes, no rhonchi  Cardiovascular: S1, S2, RRR  Abdomen: Soft, NT/ND, +BS  Neuro: No new focal deficits.   Extremities: no edema  -----------------------------------------------------------------------------------------------  PATIENT DISCHARGE INSTRUCTIONS: See electronic chart    Macario Baer MD    Time spent:   31 minutes        REVIEWER COMMENTS           Medications 06/23/25 06/24/25 06/25/25   diphenhydrAMINE (Benadryl) 50 mg/mL injection 25 mg  Dose: 25 mg  Freq: Once Route:  IV  Start: 06/24/25 0005 End: 06/24/25 0007     0007 TC-Given                      LORazepam (Ativan) 2 mg/mL injection 1 mg  Dose: 1 mg  Freq: Once Route: IV  Start: 06/24/25 0340 End: 06/24/25 0447   Admin Instructions:   For IV use dilute 1:1 with saline     0447 KA-Given           magnesium oxide (Mag-Ox) tab 400 mg  Dose: 400 mg  Freq: Once Route: OR  Start: 06/25/25 0715 End: 06/25/25 0817      0817 XO-Given          metoclopramide (Reglan) 5 mg/mL injection 10 mg  Dose: 10 mg  Freq: Once Route: IV  Start: 06/24/25 0005 End: 06/24/25 0006     0006 TC-Given           pantoprazole (Protonix) 40 mg in sodium chloride 0.9% PF 10 mL IV push  Dose: 40 mg  Freq: Every 12 hours Route: IV  Start: 06/24/25 0430 End: 06/25/25 1638   Admin Instructions:   Dilute with 10 ml NS; IV push over 2 minutes     (0430 KA)-Not Given [C]   1352 DS-Given   2321 LR-Given      1157 XO-Given   1638-D/C'd       pantoprazole (Protonix) 40 mg in sodium chloride 0.9% PF 10 mL IV push  Dose: 40 mg  Freq: Once Route: IV  Start: 06/24/25 0012 End: 06/24/25 0035   Admin Instructions:   Dilute with 10 ml NS; IV push over 2 minutes     0033 TC-Given           potassium chloride (Klor-Con M20) tab 40 mEq  Dose: 40 mEq  Freq: Once Route: OR  Start: 06/25/25 0715 End: 06/25/25 0817   Admin Instructions:   Do not crush      0817 XO-Given          potassium chloride 20 mEq/100mL IVPB premix 20 mEq  Dose: 20 mEq  Freq: Once Route: IV  Last Dose: 20 mEq (06/24/25 0321)  Start: 06/24/25 0300 End: 06/24/25 0521     0321 TC-New Bag   0354 TC-Handoff              sodium chloride 0.9 % IV bolus 1,000 mL  Dose: 1,000 mL  Freq: Once Route: IV  Last Dose: Stopped (06/24/25 0100)  Start: 06/24/25 0005 End: 06/24/25 0100     0006 TC-New Bag   0100 TC-Stopped            Latest Reference Range & Units 06/23/25 21:26 06/24/25 00:33 06/24/25 05:53 06/24/25 12:59 06/24/25 18:16 06/24/25 23:52 06/25/25 04:41 06/25/25 12:32   Glucose 70 - 99 mg/dL 119 (H)      95     Sodium 136 - 145 mmol/L 136      139    Potassium 3.5 - 5.1 mmol/L 3.1 (L)      3.1 (L)    Chloride 98 - 112 mmol/L 91 (L)      100    Carbon Dioxide, Total 21.0 - 32.0 mmol/L 24.0      27.0    BUN 9 - 23 mg/dL 8 (L)      <5 (L)    CREATININE 0.55 - 1.02 mg/dL 1.15 (H)      0.75    CALCIUM 8.7 - 10.6 mg/dL 10.7 (H)      8.9    EGFR >=60 mL/min/1.73m2 61      103    ANION GAP 0 - 18 mmol/L 21 (H)      12    CALCULATED OSMOLALITY 275 - 295 mOsm/kg 281          ALKALINE PHOSPHATASE 37 - 98 U/L 103 (H)          AST (SGOT) <34 U/L 24          ALT (SGPT) 10 - 49 U/L 24          Total Bilirubin 0.3 - 1.2 mg/dL 1.1          Globulin 2.0 - 3.5 g/dL 3.0          Magnesium, Serum 1.6 - 2.6 mg/dL  2.0     1.8    Lipase 12 - 53 U/L 50          A/G Ratio 1.0 - 2.0  1.9          PROTEIN, TOTAL 5.7 - 8.2 g/dL 8.7 (H)          Albumin 3.2 - 4.8 g/dL 5.7 (H)          PT 11.6 - 14.8 seconds  13.5         INR 0.80 - 1.20   1.02         APTT 23.0 - 36.0 seconds  25.4         WBC 4.0 - 11.0 x10(3) uL 17.1 (H)  17.4 (H) 13.2 (H) 12.6 (H) 11.9 (H) 10.3 9.0   Hemoglobin 12.0 - 16.0 g/dL 14.9  11.8 (L) 11.6 (L) 12.4 11.8 (L) 11.6 (L) 12.6   Hematocrit 35.0 - 48.0 % 41.4  32.9 (L) 33.1 (L) 35.8 33.4 (L) 32.3 (L) 36.5   Platelet Count 150.0 - 450.0 10(3)uL 458.0 (H)  341.0 321.0 343.0 352.0 328.0 374.0   RBC 3.80 - 5.30 x10(6)uL 5.09  4.00 3.95 4.24 4.04 3.90 4.31     06/25/25 1158 98.4 °F (36.9 °C) 56 16 157/98 Abnormal  98 % -- None (Room air) 0 L/min TC   06/25/25 1120 -- 102 -- 164/107 Abnormal  96 % -- -- -- JENNIFER   06/25/25 1115 -- 86 -- 170/95 Abnormal  97 % -- -- -- JENNIFER   06/25/25 1110 -- 80 -- 168/106 Abnormal  98 % -- -- -- JENNIFER   06/25/25 1105 -- 74 -- 163/106 Abnormal  98 % -- -- --    06/25/25 1102 -- 83 16 143/96 Abnormal  95 % -- None (Room air) --    06/25/25 1059 -- 81 15 -- 97 % -- -- -- ML   06/25/25 0421 99 °F (37.2 °C) 94 18 -- -- -- None (Room air) 0 L/min AH   06/25/25 0016 99.5 °F (37.5 °C) 73 18 150/86 98 % -- None  (Room air) 0 L/min    06/24/25 1905 98.7 °F (37.1 °C) 86 18 139/82 99 % -- None (Room air) 0 L/min KE   06/24/25 1221 98 °F (36.7 °C) 88 16 134/79 98 % -- None (Room air) 0 L/min TC   06/24/25 0433 -- -- -- -- -- 140 lb (63.5 kg) -- -- KA   06/24/25 0345 -- 93 14 130/90 99 % -- None (Room air) -- [facility]   06/24/25 0330 -- 93 13 -- 99 % -- None (Room air) -- [facility]   06/24/25 0200 -- 80 18 117/78 97 % -- None (Room air) -- [facility]   06/24/25 0115 -- 81 12 122/107 Abnormal  100 % -- -- -- [facility]   06/24/25 0030 -- 79 11 154/97 Abnormal  99 % -- -- -- [facility]   06/23/25 2300 -- 95 -- 153/118 Abnormal  100 % -- None (Room air) -- [facility]   06/23/25 2054 97.9 °F (36.6 °C) 120 20 147/108 Abnormal  -- 140 lb (63.5 kg) -- -- DV

## 2025-07-03 PROBLEM — N17.9 AKI (ACUTE KIDNEY INJURY): Status: ACTIVE | Noted: 2025-07-03

## 2025-07-03 PROBLEM — E83.52 HYPERCALCEMIA: Status: ACTIVE | Noted: 2025-07-03

## 2025-07-03 PROBLEM — K44.9 HIATAL HERNIA: Status: ACTIVE | Noted: 2025-07-03

## 2025-07-03 PROBLEM — R91.1 LUNG NODULE: Status: ACTIVE | Noted: 2025-07-03

## 2025-07-03 PROBLEM — K20.80 LOS ANGELES GRADE D ESOPHAGITIS: Status: ACTIVE | Noted: 2025-07-03

## 2025-07-03 NOTE — PROGRESS NOTES
TCM VISIT  [facility]      HISTORY   HPI: Mattie Zheng is a 40 year old female here today for hospital follow up for coffee-ground emesis, intractable nausea and vomiting, LA grade D esophagitis, hiatal hernia, STANLEY, hypokalemia, hypercalcemia, lung nodule.  Mattie Zheng was discharged from [facility]  to Home or Self Care.  Admit Date: 25. Discharge Date: 25.     Hospital Discharge Diagnosis:     #Coffee ground emesis; underwent EGD and showed the followin. LA grade D esophagitis  2. 3 cm hiatal hernia     PPI for next 8 weeks; see below; plan repeat EGD 12 weeks; tolerating diet at time of discharge; no complications.  Avoid marijuan usea     #hx esophagitis     #hypokalemia     #elevated serum creatinine; resolved back to normal with fluids     #anxiety     #insomnia    Hospital stay was complicated by recurrent esophagitis.  Mattie Zheng was discharge with changed to Klonopin, pantoprazole, no NSAIDs, no marijuana, lung nodule follow-up, stop Reglan, levothyroxine and a referral to the [facility] for continued follow up.      Today, patient is being seen for hospital follow-up.  She reports doing a lot better since discharge.    She presented to ED for emesis.  She has been very stressed and anxious and feels like one of her symptoms of stress is emesis.  She has been taking Zofran at home but reports when she starts vomiting she cannot keep anything down or get it to stop.  She was having a burning in her midline chest as well.  She was hospitalized in January for similar symptoms and was found to have grade D esophagitis.  She reports just stopping her PPI 2-3 weeks ago.  Her vomit was initially yellow but looks more like coffee grounds with her most recent episode.  She was admitted for coffee-ground emesis and underwent EGD that showed LA grade D esophagitis and a 3 cm hiatal hernia.  She was restarted on pantoprazole twice daily for the next 8 weeks with plan  for repeat EGD in 12 weeks.  She was tolerating a diet at time of discharge with no complications.  She was advised to avoid marijuana use  She had mild STANLEY that resolved with IV hydration.  She was cleared by consultants and discharged home in stable condition.    Interactive contact within 2 business days post discharge first initiated on Date: 6/26/2025      Allergies:  Allergies[1]   Current Meds:  Current Outpatient Medications   Medication Sig Dispense Refill    pantoprazole 40 MG Oral Tab EC Take 1 tablet (40 mg total) by mouth 2 (two) times daily before meals. 120 tablet 0    QUEtiapine (SEROQUEL) 100 MG Oral Tab Take 1.5 tablets (150 mg total) by mouth nightly. 135 tablet 0    zolpidem 10 MG Oral Tab Take 1 tablet (10 mg total) by mouth nightly as needed for Sleep. 30 tablet 1    clonazePAM 1 MG Oral Tab Take 1 tablet (1 mg total) by mouth 3 (three) times daily as needed for Anxiety. 90 tablet 1     No current facility-administered medications for this visit.       HISTORY: reconciled and reviewed with patient  Past Medical History[2]   Past Surgical History[3]   Family History[4]   Short Social Hx on File[5]     Imaging & Consults:  CT CHEST+ABDOMEN+PELVIS(ALL CNTRST ONLY)(LWO=[id number])  Result Date: 6/24/2025  CONCLUSION:  1. There is fluid density within the esophagus and there is some mild esophageal wall thickening at the GE junction.  Findings could reflect esophagitis with gastroesophageal reflux, poor esophageal motility or distal esophageal stricture.  Recommend correlation with endoscopic evaluation or esophagram. 2. Stable 4 mm noncalcified right lower lobe lung nodule.  Follow-up as per Fleischner criteria recommended. 3. 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 4. Hepatic steatosis.  The findings include a single, incidentally detected, solid pulmonary nodule, measuring less than 6mm.  2017 guidelines from the Fleischner Society for the  follow-up and management of incidentally detected indeterminate pulmonary nodules in persons at least 35 years of age depend on nodule size (average length and width) and underlying risk factors (including smoking and other risk factors). Please consider the following recommendations after clinical assessment of risk factors.  For <6mm solid nodules: In low risk patients, no follow-up required.  If suspicious morphology or upper lobe location, consider 12 month follow-up.  In high risk patients, optional CT in 12 months.       LOCATION:  [facility]    Dictated by (CST): Mateusz Houhg MD on 6/24/2025 at 7:09 AM     Finalized by (CST): Mateusz Hough MD on 6/24/2025 at 7:25 AM         Lab Results   Component Value Date/Time    WBC 9.0 06/25/2025 12:32 PM    HGB 12.6 06/25/2025 12:32 PM    HCT 36.5 06/25/2025 12:32 PM    .0 06/25/2025 12:32 PM    GLU 95 06/25/2025 04:41 AM     06/25/2025 04:41 AM    K 3.1 (L) 06/25/2025 04:41 AM     06/25/2025 04:41 AM    CO2 27.0 06/25/2025 04:41 AM    BUN <5 (L) 06/25/2025 04:41 AM    CREATSERUM 0.75 06/25/2025 04:41 AM    CA 8.9 06/25/2025 04:41 AM    MG 1.8 06/25/2025 04:41 AM    ALB 5.7 (H) 06/23/2025 09:26 PM    ALT 24 06/23/2025 09:26 PM    AST 24 06/23/2025 09:26 PM    INR 1.02 06/24/2025 12:33 AM    A1C 5.3 12/06/2023 07:17 AM       Immunization History   Administered Date(s) Administered    Covid-19 Vaccine Pfizer 30 mcg/0.3 ml 06/15/2021, 07/06/2021       ROS:  GENERAL: energy stable, denies fever, weakness  SKIN: denies any skin changes  EYES: denies blurred vision, eye pain  HEENT: denies ear pain, loss of hearing, sore throat  RESPIRATORY: denies cough, denies dyspnea with exertion  CARDIOVASCULAR: denies syncope, chest pain, fatigue, palpitations   GI: denies abdominal pain, melena, constipation, diarrhea, nausea, vomiting   MUSCULOSKELETAL: denies pain, states normal range of motion of extremities  NEUROLOGIC: denies confusion, balance  difficulty  PSYCHIATRIC: denies depression, + anxiety  HEMATOLOGIC: + mild anemia, denies bruising, bleeding    PHYSICAL EXAM:  Vitals:    06/30/25 1351   BP: 118/70   Pulse: 68   Resp: 16   Temp: 97.7 °F (36.5 °C)       GENERAL: well developed, well nourished, in no apparent distress, good historian  INTEGUMENTARY: warm, dry, no rashes  HEENT: atraumatic, normocephalic, sclera white, conjunctivae pink  NECK: supple  CHEST: no chest tenderness  LUNGS: clear to auscultation bilaterally, no wheezes, rhonchi or rales, normal respiratory effort  CARDIO: S1, S2, RRR without audible murmur  GI: + BS to all quadrants, no tenderness  MUSCULOSKELETAL: + ROM in all joints, no evidence of swelling, pain   EXTREMITIES: no cyanosis, or edema  NEURO: Oriented x3  PSYCHIATRIC: appropriate affect    ASSESSMENT/ PLAN:   1. Coffee ground emesis/intractable nausea and vomiting/LA grade D esophagitis/hiatal hernia  Recurrent coffee-ground emesis with last admission in January and stopped her PPI 2-3 weeks prior  Reports increased stress in her response is vomiting and once she starts she cannot stop  Reports emesis was initially yellow in color but became more coffee-ground like with last episode  Admitted and seen by GI and underwent EGD that showed LA grade D esophagitis and a 3 cm hiatal hernia  Discharged home on  Pantoprazole 40 mg 2 times daily before meals-x 8 weeks then further dosing per GI  Will need repeat EGD in 12 weeks to ensure improvement/resolution  She is to avoid NSAIDs-reports taking 1 dose since discharge  She is also to avoid marijuana use-was using edibles for menstrual cramping-limit anything that could cause nausea and vomiting  Denies any further nausea or vomiting or fever or chills  No bleeding at time of exam  No further epigastric pain  Tolerating a soft diet  Appetite is good/drinking well  Moving bowels/passing gas  Follow-up with GI Justus JONES on 7/18 at 1:40 PM  Follow-up with psychiatry  Emy JONES on 9/3 at 1:30 PM  Follow-up with PCP Dr. Iniguez on 7/28 at 2:30 PM  All pertinent hospital records, labs, radiology from current hospitalization reviewed    2. STANLEY (acute kidney injury)  Resolved with IV hydration  Kidney function at discharge  BUN < 5  Creatinine 0.75    Continue to monitor labs as directed    3. Hypokalemia  Remained low  Replaced per electrolyte protocol  Continue to monitor labs as directed    4. Hypercalcemia  Resolved  Likely secondary to dehydration  Calcium on 6/25 prior to discharge was stable at 8.9  Continue to monitor labs as directed    5. Lung nodule  Incidental finding of right lower lobe 4 mm nodule  Patient is low risk with no history of smoking  Should have follow-up surveillance in 6-12 months-defer to PCP      No orders of the defined types were placed in this encounter.          Medications & Refills for this Visit:  Current Medications[6]  Requested Prescriptions      No prescriptions requested or ordered in this encounter         Health Maintenance:  Health Maintenance   Topic Date Due    Annual Physical  Never done    Mammogram  Never done    DTaP,Tdap,and Td Vaccines (1 - Tdap) Never done    Pap Smear  Never done    COVID-19 Vaccine (3 - 2024-25 season) 09/01/2024    Influenza Vaccine (1) 10/01/2025    Annual Depression Screening  Completed    Pneumococcal Vaccine: Birth to 50yrs  Aged Out    Meningococcal B Vaccine  Aged Out       Chronic Care Management Referral: N/A      Transitional Care Management Certification:  During the visit, I accessed [employer] and/or [employer] Everywhere and personally reviewed the following for the recent hospitalization: provider notes, consults, summaries, labs and other test results and the pertinent findings are documented below.     Medication Reconciliation:  I am aware of an inpatient discharge within the last 30 days.  The discharge medication list has been reconciled with the patient's current medication list and  reviewed by me.  See medication list for additions of new medication, and changes to current doses of medications and discontinued medications.        Discharge Disposition/Plan:     Future Appointments   Date Time Provider Department Center   7/18/2025  1:40 PM Justus Herr APRN [facility]   7/28/2025  2:30 PM Hiram Iniguez MD [facility] [address]   9/3/2025  1:30 PM Lourdes Wagoner APRN [id number]      1.  Transitional Care Clinic Visit: Next visit Discharged  2.  PCP Visit: 7/28/2025  3.  Chronic Care Management: N/A     RENATO Fuller, 6/30/2025  [facility]  [phone]       [1]   Allergies  Allergen Reactions    Ceclor [Cefaclor] RASH   [2]   Past Medical History:   Dysmenorrhea    Esophageal reflux    Hypothyroidism   [3] No past surgical history on file.  [4]   Family History  Problem Relation Age of Onset    Hypertension Father    [5]   Social History  Socioeconomic History    Marital status:    Tobacco Use    Smoking status: Never     Passive exposure: Never    Smokeless tobacco: Never   Vaping Use    Vaping status: Never Used   Substance and Sexual Activity    Alcohol use: Not Currently    Drug use: Never     Social Drivers of Health     Food Insecurity: No Food Insecurity (6/24/2025)    NCSS - Food Insecurity     Worried About Running Out of Food in the Last Year: No     Ran Out of Food in the Last Year: No   Transportation Needs: No Transportation Needs (6/24/2025)    NCSS - Transportation     Lack of Transportation: No   Housing Stability: Not At Risk (6/24/2025)    NCSS - Housing/Utilities     Has Housing: Yes     Worried About Losing Housing: No     Unable to Get Utilities: No   [6]    pantoprazole 40 MG Oral Tab EC Take 1 tablet (40 mg total) by mouth 2 (two) times daily before meals. 120 tablet 0    [DISCONTINUED] zolpidem 10 MG Oral Tab Take 1 tablet (10 mg total) by mouth nightly as needed for Sleep. 30 tablet 0    [DISCONTINUED]  clonazePAM 1 MG Oral Tab Take 1 tablet (1 mg total) by mouth 3 (three) times daily as needed for Anxiety. 90 tablet 0    QUEtiapine (SEROQUEL) 100 MG Oral Tab Take 1.5 tablets (150 mg total) by mouth nightly. 135 tablet 0

## 2025-08-14 ENCOUNTER — OFFICE VISIT (OUTPATIENT)
Dept: FAMILY MEDICINE CLINIC | Facility: CLINIC | Age: 40
End: 2025-08-14

## 2025-08-14 VITALS
RESPIRATION RATE: 16 BRPM | DIASTOLIC BLOOD PRESSURE: 80 MMHG | HEIGHT: 63 IN | BODY MASS INDEX: 26.84 KG/M2 | TEMPERATURE: 97 F | WEIGHT: 151.5 LBS | OXYGEN SATURATION: 100 % | HEART RATE: 83 BPM | SYSTOLIC BLOOD PRESSURE: 110 MMHG

## 2025-08-14 DIAGNOSIS — R07.9 CHEST PAIN, UNSPECIFIED TYPE: ICD-10-CM

## 2025-08-14 DIAGNOSIS — K92.0 COFFEE GROUND EMESIS: ICD-10-CM

## 2025-08-14 DIAGNOSIS — Z01.818 PREOPERATIVE EXAMINATION: Primary | ICD-10-CM

## 2025-08-14 DIAGNOSIS — R94.31 ABNORMAL EKG: ICD-10-CM

## 2025-08-14 DIAGNOSIS — E03.9 ACQUIRED HYPOTHYROIDISM: ICD-10-CM

## 2025-08-14 DIAGNOSIS — Z12.31 BREAST CANCER SCREENING BY MAMMOGRAM: ICD-10-CM

## 2025-08-14 DIAGNOSIS — E87.6 HYPOKALEMIA: ICD-10-CM

## 2025-08-14 DIAGNOSIS — Z00.00 LABORATORY EXAM ORDERED AS PART OF ROUTINE GENERAL MEDICAL EXAMINATION: ICD-10-CM

## 2025-08-14 DIAGNOSIS — R11.2 INTRACTABLE NAUSEA AND VOMITING: ICD-10-CM

## 2025-08-14 PROCEDURE — 99214 OFFICE O/P EST MOD 30 MIN: CPT | Performed by: STUDENT IN AN ORGANIZED HEALTH CARE EDUCATION/TRAINING PROGRAM

## 2025-08-14 PROCEDURE — 3079F DIAST BP 80-89 MM HG: CPT | Performed by: STUDENT IN AN ORGANIZED HEALTH CARE EDUCATION/TRAINING PROGRAM

## 2025-08-14 PROCEDURE — 3008F BODY MASS INDEX DOCD: CPT | Performed by: STUDENT IN AN ORGANIZED HEALTH CARE EDUCATION/TRAINING PROGRAM

## 2025-08-14 PROCEDURE — 3074F SYST BP LT 130 MM HG: CPT | Performed by: STUDENT IN AN ORGANIZED HEALTH CARE EDUCATION/TRAINING PROGRAM

## 2025-08-14 RX ORDER — LEVOTHYROXINE SODIUM 25 UG/1
25 TABLET ORAL
Qty: 30 TABLET | Refills: 1 | Status: SHIPPED | OUTPATIENT
Start: 2025-08-14 | End: 2025-08-18

## 2025-08-14 RX ORDER — LEVOTHYROXINE SODIUM 25 UG/1
25 TABLET ORAL
Qty: 30 TABLET | Refills: 0 | Status: CANCELLED | OUTPATIENT
Start: 2025-08-14

## 2025-08-14 RX ORDER — LEVOTHYROXINE SODIUM 25 UG/1
25 TABLET ORAL
COMMUNITY
End: 2025-08-14

## 2025-08-18 RX ORDER — LEVOTHYROXINE SODIUM 25 UG/1
25 TABLET ORAL
Qty: 90 TABLET | Refills: 0 | Status: SHIPPED | OUTPATIENT
Start: 2025-08-18

## 2025-08-22 ENCOUNTER — HOSPITAL ENCOUNTER (OUTPATIENT)
Dept: CV DIAGNOSTICS | Age: 40
Discharge: HOME | End: 2025-08-22
Attending: STUDENT IN AN ORGANIZED HEALTH CARE EDUCATION/TRAINING PROGRAM

## 2025-08-22 DIAGNOSIS — R07.9 CHEST PAIN, UNSPECIFIED TYPE: ICD-10-CM

## 2025-08-22 DIAGNOSIS — Z01.818 PREOPERATIVE EXAMINATION: ICD-10-CM

## 2025-08-22 LAB
% OF MAX PREDICTED HR: 90 %
MAX DIASTOLIC BP: 80 MMHG
MAX HEART RATE: 173 BPM
MAX PREDICTED HEART RATE: 179 BPM
MAX SYSTOLIC BP: 198 MMHG
MAX WORK LOAD: 91

## 2025-08-22 PROCEDURE — 93018 CV STRESS TEST I&R ONLY: CPT | Performed by: INTERNAL MEDICINE

## 2025-08-22 PROCEDURE — 93017 CV STRESS TEST TRACING ONLY: CPT | Performed by: STUDENT IN AN ORGANIZED HEALTH CARE EDUCATION/TRAINING PROGRAM

## 2025-08-22 PROCEDURE — 93016 CV STRESS TEST SUPVJ ONLY: CPT | Performed by: INTERNAL MEDICINE

## (undated) DEVICE — BITEBLOCK ENDOSCP 60FR MAXI STRP

## (undated) DEVICE — KIT VLV 5 PC AIR H2O SUCT BX ENDOGATOR CONN

## (undated) DEVICE — KIT CUSTOM ENDOPROCEDURE STERIS

## (undated) DEVICE — GIJAW SINGLE-USE BIOPSY FORCEPS WITH NEEDLE: Brand: GIJAW

## (undated) DEVICE — 10FT COMBINED O2 DELIVERY/CO2 MONITORING. FILTER WITH MICROSTREAM TYPE LUER: Brand: DUAL ADULT NASAL CANNULA

## (undated) DEVICE — 1200CC GUARDIAN II: Brand: GUARDIAN

## (undated) DEVICE — 3M™ RED DOT™ MONITORING ELECTRODE WITH FOAM TAPE AND STICKY GEL 2570-3, 3/BAG, 200/CASE, 54/PLT: Brand: RED DOT™

## (undated) DEVICE — V2 SPECIMEN COLLECTION MANIFOLD KIT: Brand: NEPTUNE

## (undated) NOTE — LETTER
[address]  [address]  Authorization for Surgical Operation and Procedure    Date:___________                                                                                                         Time:__________  1.  I hereby authorizeSurgeon(s):  Matthew Taveras MD, my physician and his/her assistants (if applicable), which may include medical students, residents, and/or fellows, to perform the following surgical operation/ procedure and administer such anesthesia as may be determined necessary by my physician:  Operation/Procedure name (s) Procedure(s):  ESOPHAGOGASTRODUODENOSCOPY (EGD) on Mattie Zheng   2.   I recognize that during the surgical operation/procedure, unforeseen conditions may necessitate additional or different procedures than those listed above.  I, therefore, further authorize and request that the above-named surgeon, assistants, or designees perform such procedures as are, in their judgment, necessary and desirable.    3.   My surgeon/physician has discussed prior to my surgery the potential benefits, risks and side effects of this procedure; the likelihood of achieving goals; and potential problems that might occur during recuperation.  They also discussed reasonable alternatives to the procedure, including risks, benefits, and side effects related to the alternatives and risks related to not receiving this procedure.  I have had all my questions answered and I acknowledge that no guarantee has been made as to the result that may be obtained.    4.   Should the need arise during my operation/procedure, which includes change of level of care prior to discharge, I also consent to the administration of blood and/or blood products.  Further, I understand that despite careful testing and screening of blood or blood products by collecting agencies, I may still be subject to ill effects as a result of receiving a blood transfusion and/or blood products.  The  following are some, but not all, of the potential risks that can occur: fever and allergic reactions, hemolytic reactions, transmission of diseases such as Hepatitis, AIDS and Cytomegalovirus (CMV) and fluid overload.  In the event that I wish to have an autologous transfusion of my own blood, or a directed donor transfusion, I will discuss this with my physician.  Check only if Refusing Blood or Blood Products  I understand refusal of blood or blood products as deemed necessary by my physician may have serious consequences to my condition to include possible death. I hereby assume responsibility for my refusal and release the hospital, its personnel, and my physicians from any responsibility for the consequences of my refusal.         o  Refuse    5.   I authorize the use of any specimen, organs, tissues, body parts or foreign objects that may be removed from my body during the operation/procedure for diagnosis, research or teaching purposes and their subsequent disposal by hospital authorities.  I also authorize the release of specimen test results and/or written reports to my treating physician on the hospital medical staff or other referring or consulting physicians involved in my care, at the discretion of the Pathologist or my treating physician.    6.   I consent to the photographing or videotaping of the operations or procedures to be performed, including appropriate portions of my body for medical, scientific, or educational purposes, provided my identity is not revealed by the pictures or by descriptive texts accompanying them.  If the procedure has been photographed/videotaped, the surgeon will obtain the original picture, image, videotape or CD.  The hospital will not be responsible for storage, release or maintenance of the picture, image, tape or CD.    7.   I consent to the presence of a  or observers in the operating room as deemed necessary by my physician or their designees.    8.    I recognize that in the event my procedure results in extended X-Ray/fluoroscopy time, I may develop a skin reaction.    9. If I have a Do Not Attempt Resuscitation (DNAR) order in place, that status will be suspended while in the operating room, procedural suite, and during the recovery period unless otherwise explicitly stated by me (or a person authorized to consent on my behalf). The surgeon or my attending physician will determine when the applicable recovery period ends for purposes of reinstating the DNAR order.  10. Patients having a sterilization procedure: I understand that if the procedure is successful the results will be permanent and it will therefore be impossible for me to inseminate, conceive, or bear children.  I also understand that the procedure is intended to result in sterility, although the result has not been guaranteed.   11. I acknowledge that my physician has explained sedation/analgesia administration to me including the risk and benefits I consent to the administration of sedation/analgesia as may be necessary or desirable in the judgment of my physician.    I CERTIFY THAT I HAVE READ AND FULLY UNDERSTAND THE ABOVE CONSENT TO OPERATION and/or OTHER PROCEDURE.     _________________________________________  __________________________________  Signature of Patient     Signature of Responsible Person         ___________________________________         Printed Name of Responsible Person             _________________________________                 Relationship to Patient  _________________________________________  ______________________________  Signature of Witness          Date  Time      Patient Name: Mattie Zheng     : 1985                 Printed: 2025     Medical Record #: [id number]                                            Page 2 of 3      [address]  [address]    Consent for Anesthesia    I, Mattie Zheng agree to be  cared for by an anesthesiologist, who is specially trained to monitor me and give me medicine to put me to sleep or keep me comfortable during my procedure    I understand that my anesthesiologist is not an employee or agent of [facility] or [facility] [employer]. He or she works for [employer].    As the patient asking for anesthesia services, I agree to:  Allow the anesthesiologist (anesthesia doctor) to give me medicine and do additional procedures as necessary. Some examples are: Starting or using an “IV” to give me medicine, fluids or blood during my procedure, and having a breathing tube placed to help me breathe when I’m asleep (intubation). In the event that my heart stops working properly, I understand that my anesthesiologist will make every effort to sustain my life, unless otherwise directed by [facility] Do Not Resuscitate documents.  Tell my anesthesia doctor before my procedure:  If I am pregnant.  The last time that I ate or drank.  All of the medicines I take (including prescriptions, herbal supplements, and pills I can buy without a prescription (including street drugs/illegal medications). Failure to inform my anesthesiologist about these medicines may increase my risk of anesthetic complications.  If I am allergic to anything or have had a reaction to anesthesia before.  I understand how the anesthesia medicine will help me (benefits).  I understand that with any type of anesthesia medicine there are risks:  The most common risks are: nausea, vomiting, sore throat, muscle soreness, damage to my eyes, mouth, or teeth (from breathing tube placement).  Rare risks include: remembering what happened during my procedure, allergic reactions to medications, injury to my airway, heart, lungs, vision, nerves, or muscles and in extremely rare instances death.  My doctor has explained to me other choices available to me for my care (alternatives).  Pregnant Patients  (“epidural”):  I understand that the risks of having an epidural (medicine given into my back to help control pain during labor), include itching, low blood pressure, difficulty urinating, headache or slowing of the baby’s heart. Very rare risks include infection, bleeding, seizure, irregular heart rhythms and nerve injury.  Regional Anesthesia (“spinal”, “epidural”, & “nerve blocks”):  I understand that rare but potential complications include headache, bleeding, infection, seizure, irregular heart rhythms, and nerve injury.    I can change my mind about having anesthesia services at any time before I get the medicine.    _____________________________________________________________________________  Patient (or Representative) Signature/Relationship to Patient  Date   Time    _____________________________________________________________________________   Name (if used)    Language/Organization   Time    _____________________________________________________________________________  Anesthesiologist Signature     Date   Time  I have discussed the procedure and information above with the patient (or patient’s representative) and answered their questions. The patient or their representative has agreed to have anesthesia services.    _____________________________________________________________________________  Witness        Date   Time  I have verified that the signature is that of the patient or patient’s representative, and that it was signed before the procedure  Patient Name: Mattie Zheng     : 1985                 Printed: 2025     Medical Record #: [id number]                     Page 3 of 3

## (undated) NOTE — LETTER
[address]  [address]  Authorization for Surgical Operation and Procedure     Date:___________                                                                                                         Time:__________  I hereby authorize Surgeon(s):  Matthew Taveras MD, my physician and his/her assistants (if applicable), which may include medical students, residents, and/or fellows, to perform the following surgical operation/ procedure and administer such anesthesia as may be determined necessary by my physician:  Operation/Procedure name (s) Procedure(s):  ESOPHAGOGASTRODUODENOSCOPY (EGD) on Mattie Zheng   2.   I recognize that during the surgical operation/procedure, unforeseen conditions may necessitate additional or different procedures than those listed above.  I, therefore, further authorize and request that the above-named surgeon, assistants, or designees perform such procedures as are, in their judgment, necessary and desirable.    3.   My surgeon/physician has discussed prior to my surgery the potential benefits, risks and side effects of this procedure; the likelihood of achieving goals; and potential problems that might occur during recuperation.  They also discussed reasonable alternatives to the procedure, including risks, benefits, and side effects related to the alternatives and risks related to not receiving this procedure.  I have had all my questions answered and I acknowledge that no guarantee has been made as to the result that may be obtained.    4.   Should the need arise during my operation/procedure, which includes change of level of care prior to discharge, I also consent to the administration of blood and/or blood products.  Further, I understand that despite careful testing and screening of blood or blood products by collecting agencies, I may still be subject to ill effects as a result of receiving a blood transfusion and/or blood products.  The  following are some, but not all, of the potential risks that can occur: fever and allergic reactions, hemolytic reactions, transmission of diseases such as Hepatitis, AIDS and Cytomegalovirus (CMV) and fluid overload.  In the event that I wish to have an autologous transfusion of my own blood, or a directed donor transfusion, I will discuss this with my physician.  Check only if Refusing Blood or Blood Products  I understand refusal of blood or blood products as deemed necessary by my physician may have serious consequences to my condition to include possible death. I hereby assume responsibility for my refusal and release the hospital, its personnel, and my physicians from any responsibility for the consequences of my refusal.          o  Refuse      5.   I authorize the use of any specimen, organs, tissues, body parts or foreign objects that may be removed from my body during the operation/procedure for diagnosis, research or teaching purposes and their subsequent disposal by hospital authorities.  I also authorize the release of specimen test results and/or written reports to my treating physician on the hospital medical staff or other referring or consulting physicians involved in my care, at the discretion of the Pathologist or my treating physician.    6.   I consent to the photographing or videotaping of the operations or procedures to be performed, including appropriate portions of my body for medical, scientific, or educational purposes, provided my identity is not revealed by the pictures or by descriptive texts accompanying them.  If the procedure has been photographed/videotaped, the surgeon will obtain the original picture, image, videotape or CD.  The hospital will not be responsible for storage, release or maintenance of the picture, image, tape or CD.    7.   I consent to the presence of a  or observers in the operating room as deemed necessary by my physician or their designees.     8.   I recognize that in the event my procedure results in extended X-Ray/fluoroscopy time, I may develop a skin reaction.    9. If I have a Do Not Attempt Resuscitation (DNAR) order in place, that status will be suspended while in the operating room, procedural suite, and during the recovery period unless otherwise explicitly stated by me (or a person authorized to consent on my behalf). The surgeon or my attending physician will determine when the applicable recovery period ends for purposes of reinstating the DNAR order.  10. Patients having a sterilization procedure: I understand that if the procedure is successful the results will be permanent and it will therefore be impossible for me to inseminate, conceive, or bear children.  I also understand that the procedure is intended to result in sterility, although the result has not been guaranteed.   11. I acknowledge that my physician has explained sedation/analgesia administration to me including the risk and benefits I consent to the administration of sedation/analgesia as may be necessary or desirable in the judgment of my physician.    I CERTIFY THAT I HAVE READ AND FULLY UNDERSTAND THE ABOVE CONSENT TO OPERATION and/or OTHER PROCEDURE.    _________________________________________  __________________________________  Signature of Patient     Signature of Responsible Person         ___________________________________         Printed Name of Responsible Person           _________________________________                 Relationship to Patient  _________________________________________  ______________________________  Signature of Witness          Date  Time      Patient Name: Mattie Zheng     : 1985                 Printed: 2025     Medical Record #: [id number]                     Page [address] of [address] [place] [address]  [id number]    Consent for Anesthesia    I, Mattie Zheng agree to  be cared for by an anesthesiologist, who is specially trained to monitor me and give me medicine to put me to sleep or keep me comfortable during my procedure    I understand that my anesthesiologist is not an employee or agent of [facility] or [employer]. He or she works for [employer].    As the patient asking for anesthesia services, I agree to:  Allow the anesthesiologist (anesthesia doctor) to give me medicine and do additional procedures as necessary. Some examples are: Starting or using an “IV” to give me medicine, fluids or blood during my procedure, and having a breathing tube placed to help me breathe when I’m asleep (intubation). In the event that my heart stops working properly, I understand that my anesthesiologist will make every effort to sustain my life, unless otherwise directed by [facility] Do Not Resuscitate documents.  Tell my anesthesia doctor before my procedure:  If I am pregnant.  The last time that I ate or drank.  All of the medicines I take (including prescriptions, herbal supplements, and pills I can buy without a prescription (including street drugs/illegal medications). Failure to inform my anesthesiologist about these medicines may increase my risk of anesthetic complications.  If I am allergic to anything or have had a reaction to anesthesia before.  I understand how the anesthesia medicine will help me (benefits).  I understand that with any type of anesthesia medicine there are risks:  The most common risks are: nausea, vomiting, sore throat, muscle soreness, damage to my eyes, mouth, or teeth (from breathing tube placement).  Rare risks include: remembering what happened during my procedure, allergic reactions to medications, injury to my airway, heart, lungs, vision, nerves, or muscles and in extremely rare instances death.  My doctor has explained to me other choices available to me for my care (alternatives).  Pregnant Patients  (“epidural”):  I understand that the risks of having an epidural (medicine given into my back to help control pain during labor), include itching, low blood pressure, difficulty urinating, headache or slowing of the baby’s heart. Very rare risks include infection, bleeding, seizure, irregular heart rhythms and nerve injury.  Regional Anesthesia (“spinal”, “epidural”, & “nerve blocks”):  I understand that rare but potential complications include headache, bleeding, infection, seizure, irregular heart rhythms, and nerve injury.    I can change my mind about having anesthesia services at any time before I get the medicine.    _____________________________________________________________________________  Patient (or Representative) Signature/Relationship to Patient  Date   Time    _____________________________________________________________________________   Name (if used)    Language/Organization   Time    _____________________________________________________________________________  Anesthesiologist Signature     Date   Time  I have discussed the procedure and information above with the patient (or patient’s representative) and answered their questions. The patient or their representative has agreed to have anesthesia services.    _____________________________________________________________________________  Witness        Date   Time  I have verified that the signature is that of the patient or patient’s representative, and that it was signed before the procedure  Patient Name: Mattie Zheng     : 1985                 Printed: 2025     Medical Record #: [id number]                     Page 2 of 2

## (undated) NOTE — LETTER
Your patient was recently seen at the [facility] for a hospital follow-up visit. The visit note is attached. Please contact the clinic with any questions at [phone].    Thank you,  RENATO Fuller